# Patient Record
Sex: FEMALE | Race: BLACK OR AFRICAN AMERICAN | NOT HISPANIC OR LATINO
[De-identification: names, ages, dates, MRNs, and addresses within clinical notes are randomized per-mention and may not be internally consistent; named-entity substitution may affect disease eponyms.]

---

## 2017-05-24 ENCOUNTER — RX RENEWAL (OUTPATIENT)
Age: 42
End: 2017-05-24

## 2017-05-26 ENCOUNTER — RX RENEWAL (OUTPATIENT)
Age: 42
End: 2017-05-26

## 2017-08-15 ENCOUNTER — RX RENEWAL (OUTPATIENT)
Age: 42
End: 2017-08-15

## 2017-08-29 ENCOUNTER — LABORATORY RESULT (OUTPATIENT)
Age: 42
End: 2017-08-29

## 2017-08-29 ENCOUNTER — OUTPATIENT (OUTPATIENT)
Dept: OUTPATIENT SERVICES | Facility: HOSPITAL | Age: 42
LOS: 1 days | End: 2017-08-29
Payer: MEDICAID

## 2017-08-29 ENCOUNTER — APPOINTMENT (OUTPATIENT)
Dept: INTERNAL MEDICINE | Facility: CLINIC | Age: 42
End: 2017-08-29

## 2017-08-29 VITALS
OXYGEN SATURATION: 94 % | BODY MASS INDEX: 44.18 KG/M2 | WEIGHT: 234 LBS | HEART RATE: 70 BPM | SYSTOLIC BLOOD PRESSURE: 165 MMHG | DIASTOLIC BLOOD PRESSURE: 101 MMHG | HEIGHT: 61 IN

## 2017-08-29 DIAGNOSIS — R21 RASH AND OTHER NONSPECIFIC SKIN ERUPTION: ICD-10-CM

## 2017-08-29 DIAGNOSIS — I10 ESSENTIAL (PRIMARY) HYPERTENSION: ICD-10-CM

## 2017-08-29 DIAGNOSIS — Z86.69 PERSONAL HISTORY OF OTHER DISEASES OF THE NERVOUS SYSTEM AND SENSE ORGANS: ICD-10-CM

## 2017-08-29 DIAGNOSIS — D64.9 ANEMIA, UNSPECIFIED: ICD-10-CM

## 2017-08-29 DIAGNOSIS — E66.9 OBESITY, UNSPECIFIED: ICD-10-CM

## 2017-08-29 LAB
HBA1C BLD-MCNC: 5.2 % — SIGNIFICANT CHANGE UP (ref 4–5.6)
HCT VFR BLD CALC: 39.2 % — SIGNIFICANT CHANGE UP (ref 34.5–45)
HGB BLD-MCNC: 11 G/DL — LOW (ref 11.5–15.5)
MCHC RBC-ENTMCNC: 21.8 PG — LOW (ref 27–34)
MCHC RBC-ENTMCNC: 28.1 GM/DL — LOW (ref 32–36)
MCV RBC AUTO: 77.6 FL — LOW (ref 80–100)
PLATELET # BLD AUTO: 245 K/UL — SIGNIFICANT CHANGE UP (ref 150–400)
RBC # BLD: 5.05 M/UL — SIGNIFICANT CHANGE UP (ref 3.8–5.2)
RBC # FLD: 22 % — HIGH (ref 10.3–14.5)
WBC # BLD: 6.17 K/UL — SIGNIFICANT CHANGE UP (ref 3.8–10.5)
WBC # FLD AUTO: 6.17 K/UL — SIGNIFICANT CHANGE UP (ref 3.8–10.5)

## 2017-08-29 PROCEDURE — 84439 ASSAY OF FREE THYROXINE: CPT

## 2017-08-29 PROCEDURE — 80053 COMPREHEN METABOLIC PANEL: CPT

## 2017-08-29 PROCEDURE — 83036 HEMOGLOBIN GLYCOSYLATED A1C: CPT

## 2017-08-29 PROCEDURE — 85027 COMPLETE CBC AUTOMATED: CPT

## 2017-08-29 PROCEDURE — 83550 IRON BINDING TEST: CPT

## 2017-08-29 PROCEDURE — G0463: CPT

## 2017-08-29 PROCEDURE — 87389 HIV-1 AG W/HIV-1&-2 AB AG IA: CPT

## 2017-08-29 PROCEDURE — 84443 ASSAY THYROID STIM HORMONE: CPT

## 2017-08-29 PROCEDURE — 86803 HEPATITIS C AB TEST: CPT

## 2017-08-29 PROCEDURE — 80061 LIPID PANEL: CPT

## 2017-08-30 LAB
ALBUMIN SERPL ELPH-MCNC: 4.1 G/DL — SIGNIFICANT CHANGE UP (ref 3.3–5)
ALP SERPL-CCNC: 79 U/L — SIGNIFICANT CHANGE UP (ref 40–120)
ALT FLD-CCNC: 10 U/L — SIGNIFICANT CHANGE UP (ref 10–45)
ANION GAP SERPL CALC-SCNC: 15 MMOL/L — SIGNIFICANT CHANGE UP (ref 5–17)
AST SERPL-CCNC: 11 U/L — SIGNIFICANT CHANGE UP (ref 10–40)
BILIRUB SERPL-MCNC: <0.2 MG/DL — SIGNIFICANT CHANGE UP (ref 0.2–1.2)
BUN SERPL-MCNC: 11 MG/DL — SIGNIFICANT CHANGE UP (ref 7–23)
CALCIUM SERPL-MCNC: 10.2 MG/DL — SIGNIFICANT CHANGE UP (ref 8.4–10.5)
CHLORIDE SERPL-SCNC: 105 MMOL/L — SIGNIFICANT CHANGE UP (ref 96–108)
CHOLEST SERPL-MCNC: 155 MG/DL — SIGNIFICANT CHANGE UP (ref 10–199)
CO2 SERPL-SCNC: 24 MMOL/L — SIGNIFICANT CHANGE UP (ref 22–31)
CREAT SERPL-MCNC: 1.02 MG/DL — SIGNIFICANT CHANGE UP (ref 0.5–1.3)
GLUCOSE SERPL-MCNC: 103 MG/DL — HIGH (ref 70–99)
HCV AB S/CO SERPL IA: 0.19 S/CO — SIGNIFICANT CHANGE UP
HCV AB SERPL-IMP: SIGNIFICANT CHANGE UP
HDLC SERPL-MCNC: 66 MG/DL — SIGNIFICANT CHANGE UP (ref 40–125)
HIV 1+2 AB+HIV1 P24 AG SERPL QL IA: SIGNIFICANT CHANGE UP
IRON SATN MFR SERPL: 13 % — LOW (ref 14–50)
IRON SATN MFR SERPL: 44 UG/DL — SIGNIFICANT CHANGE UP (ref 30–160)
LIPID PNL WITH DIRECT LDL SERPL: 71 MG/DL — SIGNIFICANT CHANGE UP
POTASSIUM SERPL-MCNC: 4.2 MMOL/L — SIGNIFICANT CHANGE UP (ref 3.5–5.3)
POTASSIUM SERPL-SCNC: 4.2 MMOL/L — SIGNIFICANT CHANGE UP (ref 3.5–5.3)
PROT SERPL-MCNC: 8.1 G/DL — SIGNIFICANT CHANGE UP (ref 6–8.3)
SODIUM SERPL-SCNC: 144 MMOL/L — SIGNIFICANT CHANGE UP (ref 135–145)
T4 FREE SERPL-MCNC: 1.2 NG/DL — SIGNIFICANT CHANGE UP (ref 0.9–1.8)
TIBC SERPL-MCNC: 335 UG/DL — SIGNIFICANT CHANGE UP (ref 220–430)
TOTAL CHOLESTEROL/HDL RATIO MEASUREMENT: 2.3 RATIO — LOW (ref 3.3–7.1)
TRIGL SERPL-MCNC: 91 MG/DL — SIGNIFICANT CHANGE UP (ref 10–149)
TSH SERPL-MCNC: 1.67 UIU/ML — SIGNIFICANT CHANGE UP (ref 0.27–4.2)
UIBC SERPL-MCNC: 291 UG/DL — SIGNIFICANT CHANGE UP (ref 110–370)

## 2017-09-13 ENCOUNTER — LABORATORY RESULT (OUTPATIENT)
Age: 42
End: 2017-09-13

## 2017-09-13 ENCOUNTER — APPOINTMENT (OUTPATIENT)
Dept: OBGYN | Facility: CLINIC | Age: 42
End: 2017-09-13
Payer: MEDICAID

## 2017-09-13 ENCOUNTER — OUTPATIENT (OUTPATIENT)
Dept: OUTPATIENT SERVICES | Facility: HOSPITAL | Age: 42
LOS: 1 days | End: 2017-09-13
Payer: MEDICAID

## 2017-09-13 ENCOUNTER — RESULT REVIEW (OUTPATIENT)
Age: 42
End: 2017-09-13

## 2017-09-13 VITALS
BODY MASS INDEX: 44.18 KG/M2 | HEIGHT: 61 IN | SYSTOLIC BLOOD PRESSURE: 140 MMHG | WEIGHT: 234 LBS | DIASTOLIC BLOOD PRESSURE: 80 MMHG

## 2017-09-13 DIAGNOSIS — N97.9 FEMALE INFERTILITY, UNSPECIFIED: ICD-10-CM

## 2017-09-13 DIAGNOSIS — N76.0 ACUTE VAGINITIS: ICD-10-CM

## 2017-09-13 DIAGNOSIS — Z11.3 ENCOUNTER FOR SCREENING FOR INFECTIONS WITH A PREDOMINANTLY SEXUAL MODE OF TRANSMISSION: ICD-10-CM

## 2017-09-13 PROCEDURE — 87624 HPV HI-RISK TYP POOLED RSLT: CPT

## 2017-09-13 PROCEDURE — 88175 CYTOPATH C/V AUTO FLUID REDO: CPT

## 2017-09-13 PROCEDURE — G0463: CPT

## 2017-09-13 PROCEDURE — 99213 OFFICE O/P EST LOW 20 MIN: CPT | Mod: GE

## 2017-09-14 ENCOUNTER — OUTPATIENT (OUTPATIENT)
Dept: OUTPATIENT SERVICES | Facility: HOSPITAL | Age: 42
LOS: 1 days | End: 2017-09-14
Payer: MEDICAID

## 2017-09-14 ENCOUNTER — MESSAGE (OUTPATIENT)
Age: 42
End: 2017-09-14

## 2017-09-14 ENCOUNTER — APPOINTMENT (OUTPATIENT)
Dept: INTERNAL MEDICINE | Facility: CLINIC | Age: 42
End: 2017-09-14

## 2017-09-14 DIAGNOSIS — N76.0 ACUTE VAGINITIS: ICD-10-CM

## 2017-09-14 DIAGNOSIS — Z86.69 PERSONAL HISTORY OF OTHER DISEASES OF THE NERVOUS SYSTEM AND SENSE ORGANS: ICD-10-CM

## 2017-09-14 DIAGNOSIS — I10 ESSENTIAL (PRIMARY) HYPERTENSION: ICD-10-CM

## 2017-09-14 DIAGNOSIS — B96.89 ACUTE VAGINITIS: ICD-10-CM

## 2017-09-14 LAB
C TRACH RRNA SPEC QL NAA+PROBE: SIGNIFICANT CHANGE UP
CANDIDA AB TITR SER: SIGNIFICANT CHANGE UP
G VAGINALIS DNA SPEC QL NAA+PROBE: DETECTED
HPV HIGH+LOW RISK DNA PNL CVX: SIGNIFICANT CHANGE UP
N GONORRHOEA RRNA SPEC QL NAA+PROBE: SIGNIFICANT CHANGE UP
SPECIMEN SOURCE: SIGNIFICANT CHANGE UP
T VAGINALIS SPEC QL WET PREP: SIGNIFICANT CHANGE UP

## 2017-09-14 PROCEDURE — G0463: CPT

## 2017-09-19 ENCOUNTER — APPOINTMENT (OUTPATIENT)
Dept: DERMATOLOGY | Facility: CLINIC | Age: 42
End: 2017-09-19
Payer: MEDICAID

## 2017-09-19 VITALS
SYSTOLIC BLOOD PRESSURE: 142 MMHG | HEIGHT: 61 IN | BODY MASS INDEX: 44.18 KG/M2 | DIASTOLIC BLOOD PRESSURE: 80 MMHG | WEIGHT: 234 LBS

## 2017-09-19 DIAGNOSIS — Z86.79 PERSONAL HISTORY OF OTHER DISEASES OF THE CIRCULATORY SYSTEM: ICD-10-CM

## 2017-09-19 DIAGNOSIS — D22.9 MELANOCYTIC NEVI, UNSPECIFIED: ICD-10-CM

## 2017-09-19 DIAGNOSIS — Z91.89 OTHER SPECIFIED PERSONAL RISK FACTORS, NOT ELSEWHERE CLASSIFIED: ICD-10-CM

## 2017-09-19 DIAGNOSIS — L91.8 OTHER HYPERTROPHIC DISORDERS OF THE SKIN: ICD-10-CM

## 2017-09-19 DIAGNOSIS — L68.0 HIRSUTISM: ICD-10-CM

## 2017-09-19 PROCEDURE — 99203 OFFICE O/P NEW LOW 30 MIN: CPT

## 2017-09-22 DIAGNOSIS — Z31.69 ENCOUNTER FOR OTHER GENERAL COUNSELING AND ADVICE ON PROCREATION: ICD-10-CM

## 2017-09-22 DIAGNOSIS — L29.2 PRURITUS VULVAE: ICD-10-CM

## 2017-09-22 DIAGNOSIS — N93.9 ABNORMAL UTERINE AND VAGINAL BLEEDING, UNSPECIFIED: ICD-10-CM

## 2017-09-25 PROBLEM — L68.0 HIRSUTISM: Status: ACTIVE | Noted: 2017-09-19

## 2017-09-27 DIAGNOSIS — E66.9 OBESITY, UNSPECIFIED: ICD-10-CM

## 2017-09-27 DIAGNOSIS — Z86.69 PERSONAL HISTORY OF OTHER DISEASES OF THE NERVOUS SYSTEM AND SENSE ORGANS: ICD-10-CM

## 2017-10-17 ENCOUNTER — APPOINTMENT (OUTPATIENT)
Dept: OPHTHALMOLOGY | Facility: CLINIC | Age: 42
End: 2017-10-17
Payer: MEDICAID

## 2017-10-17 DIAGNOSIS — H40.003 PREGLAUCOMA, UNSPECIFIED, BILATERAL: ICD-10-CM

## 2017-10-17 PROCEDURE — 99204 OFFICE O/P NEW MOD 45 MIN: CPT

## 2017-10-17 PROCEDURE — 92250 FUNDUS PHOTOGRAPHY W/I&R: CPT

## 2017-10-30 ENCOUNTER — FORM ENCOUNTER (OUTPATIENT)
Age: 42
End: 2017-10-30

## 2017-10-31 ENCOUNTER — OUTPATIENT (OUTPATIENT)
Dept: OUTPATIENT SERVICES | Facility: HOSPITAL | Age: 42
LOS: 1 days | End: 2017-10-31
Payer: MEDICAID

## 2017-10-31 ENCOUNTER — APPOINTMENT (OUTPATIENT)
Dept: MAMMOGRAPHY | Facility: IMAGING CENTER | Age: 42
End: 2017-10-31
Payer: MEDICAID

## 2017-10-31 DIAGNOSIS — Z00.8 ENCOUNTER FOR OTHER GENERAL EXAMINATION: ICD-10-CM

## 2017-10-31 PROCEDURE — G0202: CPT | Mod: 26

## 2017-10-31 PROCEDURE — 77063 BREAST TOMOSYNTHESIS BI: CPT

## 2017-10-31 PROCEDURE — 77063 BREAST TOMOSYNTHESIS BI: CPT | Mod: 26

## 2017-10-31 PROCEDURE — 77067 SCR MAMMO BI INCL CAD: CPT

## 2017-11-14 ENCOUNTER — APPOINTMENT (OUTPATIENT)
Dept: OBGYN | Facility: CLINIC | Age: 42
End: 2017-11-14

## 2017-11-20 ENCOUNTER — APPOINTMENT (OUTPATIENT)
Dept: OPHTHALMOLOGY | Facility: CLINIC | Age: 42
End: 2017-11-20

## 2017-11-24 ENCOUNTER — APPOINTMENT (OUTPATIENT)
Dept: INTERNAL MEDICINE | Facility: CLINIC | Age: 42
End: 2017-11-24

## 2017-11-24 ENCOUNTER — LABORATORY RESULT (OUTPATIENT)
Age: 42
End: 2017-11-24

## 2017-11-24 ENCOUNTER — OUTPATIENT (OUTPATIENT)
Dept: OUTPATIENT SERVICES | Facility: HOSPITAL | Age: 42
LOS: 1 days | End: 2017-11-24
Payer: MEDICAID

## 2017-11-24 VITALS — WEIGHT: 234 LBS | RESPIRATION RATE: 14 BRPM | HEART RATE: 85 BPM | BODY MASS INDEX: 44.21 KG/M2

## 2017-11-24 VITALS — HEART RATE: 91 BPM | OXYGEN SATURATION: 98 % | SYSTOLIC BLOOD PRESSURE: 145 MMHG | DIASTOLIC BLOOD PRESSURE: 90 MMHG

## 2017-11-24 DIAGNOSIS — I10 ESSENTIAL (PRIMARY) HYPERTENSION: ICD-10-CM

## 2017-11-24 DIAGNOSIS — Z91.19 PATIENT'S NONCOMPLIANCE WITH OTHER MEDICAL TREATMENT AND REGIMEN: ICD-10-CM

## 2017-11-24 LAB
ANION GAP SERPL CALC-SCNC: 11 MMOL/L — SIGNIFICANT CHANGE UP (ref 5–17)
BUN SERPL-MCNC: 8 MG/DL — SIGNIFICANT CHANGE UP (ref 7–23)
CALCIUM SERPL-MCNC: 9.9 MG/DL — SIGNIFICANT CHANGE UP (ref 8.4–10.5)
CHLORIDE SERPL-SCNC: 106 MMOL/L — SIGNIFICANT CHANGE UP (ref 96–108)
CO2 SERPL-SCNC: 25 MMOL/L — SIGNIFICANT CHANGE UP (ref 22–31)
CREAT SERPL-MCNC: 0.94 MG/DL — SIGNIFICANT CHANGE UP (ref 0.5–1.3)
GLUCOSE SERPL-MCNC: 94 MG/DL — SIGNIFICANT CHANGE UP (ref 70–99)
POTASSIUM SERPL-MCNC: 3.5 MMOL/L — SIGNIFICANT CHANGE UP (ref 3.5–5.3)
POTASSIUM SERPL-SCNC: 3.5 MMOL/L — SIGNIFICANT CHANGE UP (ref 3.5–5.3)
SODIUM SERPL-SCNC: 142 MMOL/L — SIGNIFICANT CHANGE UP (ref 135–145)

## 2017-11-24 PROCEDURE — G0008: CPT

## 2017-11-24 PROCEDURE — 90688 IIV4 VACCINE SPLT 0.5 ML IM: CPT

## 2017-11-24 PROCEDURE — G0463: CPT

## 2017-11-24 PROCEDURE — 80048 BASIC METABOLIC PNL TOTAL CA: CPT

## 2017-11-24 RX ORDER — METRONIDAZOLE 7.5 MG/G
0.75 GEL VAGINAL
Qty: 5 | Refills: 0 | Status: COMPLETED | COMMUNITY
Start: 2017-09-14 | End: 2017-11-24

## 2017-12-05 DIAGNOSIS — Z23 ENCOUNTER FOR IMMUNIZATION: ICD-10-CM

## 2017-12-05 DIAGNOSIS — Z91.19 PATIENT'S NONCOMPLIANCE WITH OTHER MEDICAL TREATMENT AND REGIMEN: ICD-10-CM

## 2017-12-05 DIAGNOSIS — E66.9 OBESITY, UNSPECIFIED: ICD-10-CM

## 2017-12-14 ENCOUNTER — APPOINTMENT (OUTPATIENT)
Dept: NEUROLOGY | Facility: HOSPITAL | Age: 42
End: 2017-12-14

## 2017-12-14 ENCOUNTER — OUTPATIENT (OUTPATIENT)
Dept: OUTPATIENT SERVICES | Facility: HOSPITAL | Age: 42
LOS: 1 days | End: 2017-12-14
Payer: MEDICAID

## 2017-12-14 VITALS
WEIGHT: 240 LBS | HEIGHT: 61 IN | RESPIRATION RATE: 16 BRPM | SYSTOLIC BLOOD PRESSURE: 170 MMHG | BODY MASS INDEX: 45.31 KG/M2 | DIASTOLIC BLOOD PRESSURE: 127 MMHG | HEART RATE: 72 BPM

## 2017-12-14 DIAGNOSIS — R56.9 UNSPECIFIED CONVULSIONS: ICD-10-CM

## 2017-12-14 PROCEDURE — G0463: CPT

## 2017-12-26 ENCOUNTER — APPOINTMENT (OUTPATIENT)
Dept: INTERNAL MEDICINE | Facility: CLINIC | Age: 42
End: 2017-12-26

## 2018-01-14 ENCOUNTER — FORM ENCOUNTER (OUTPATIENT)
Age: 43
End: 2018-01-14

## 2018-01-15 ENCOUNTER — APPOINTMENT (OUTPATIENT)
Dept: MRI IMAGING | Facility: IMAGING CENTER | Age: 43
End: 2018-01-15
Payer: MEDICAID

## 2018-01-15 ENCOUNTER — OUTPATIENT (OUTPATIENT)
Dept: OUTPATIENT SERVICES | Facility: HOSPITAL | Age: 43
LOS: 1 days | End: 2018-01-15
Payer: MEDICAID

## 2018-01-15 DIAGNOSIS — R56.9 UNSPECIFIED CONVULSIONS: ICD-10-CM

## 2018-01-15 PROCEDURE — 70551 MRI BRAIN STEM W/O DYE: CPT | Mod: 26

## 2018-01-15 PROCEDURE — 70551 MRI BRAIN STEM W/O DYE: CPT

## 2018-01-18 ENCOUNTER — RESULT REVIEW (OUTPATIENT)
Age: 43
End: 2018-01-18

## 2018-01-25 ENCOUNTER — INPATIENT (INPATIENT)
Facility: HOSPITAL | Age: 43
LOS: 2 days | Discharge: HOME | DRG: 101 | End: 2018-01-28
Attending: PSYCHIATRY & NEUROLOGY | Admitting: PSYCHIATRY & NEUROLOGY
Payer: MEDICAID

## 2018-01-25 VITALS
SYSTOLIC BLOOD PRESSURE: 147 MMHG | HEART RATE: 72 BPM | TEMPERATURE: 98 F | RESPIRATION RATE: 18 BRPM | DIASTOLIC BLOOD PRESSURE: 92 MMHG | OXYGEN SATURATION: 100 %

## 2018-01-25 DIAGNOSIS — G40.909 EPILEPSY, UNSPECIFIED, NOT INTRACTABLE, WITHOUT STATUS EPILEPTICUS: ICD-10-CM

## 2018-01-25 DIAGNOSIS — R56.9 UNSPECIFIED CONVULSIONS: ICD-10-CM

## 2018-01-25 DIAGNOSIS — I10 ESSENTIAL (PRIMARY) HYPERTENSION: ICD-10-CM

## 2018-01-25 PROCEDURE — 95819 EEG AWAKE AND ASLEEP: CPT | Mod: 26

## 2018-01-25 RX ORDER — ENOXAPARIN SODIUM 100 MG/ML
40 INJECTION SUBCUTANEOUS DAILY
Qty: 0 | Refills: 0 | Status: DISCONTINUED | OUTPATIENT
Start: 2018-01-25 | End: 2018-01-28

## 2018-01-25 RX ORDER — AMLODIPINE BESYLATE 2.5 MG/1
5 TABLET ORAL DAILY
Qty: 0 | Refills: 0 | Status: DISCONTINUED | OUTPATIENT
Start: 2018-01-25 | End: 2018-01-28

## 2018-01-25 RX ORDER — LEVETIRACETAM 250 MG/1
250 TABLET, FILM COATED ORAL AT BEDTIME
Qty: 0 | Refills: 0 | Status: DISCONTINUED | OUTPATIENT
Start: 2018-01-25 | End: 2018-01-26

## 2018-01-25 RX ORDER — ACETAMINOPHEN 500 MG
650 TABLET ORAL EVERY 6 HOURS
Qty: 0 | Refills: 0 | Status: DISCONTINUED | OUTPATIENT
Start: 2018-01-25 | End: 2018-01-28

## 2018-01-25 RX ADMIN — ENOXAPARIN SODIUM 40 MILLIGRAM(S): 100 INJECTION SUBCUTANEOUS at 17:58

## 2018-01-25 RX ADMIN — Medication 650 MILLIGRAM(S): at 20:39

## 2018-01-25 RX ADMIN — LEVETIRACETAM 250 MILLIGRAM(S): 250 TABLET, FILM COATED ORAL at 22:54

## 2018-01-25 NOTE — H&P ADULT - ASSESSMENT
Pt is a 42 year old female who is coming in for elective admission for EMU monitoring to characterize/manage her seizures. MRI Brain done 1/15 showed no acute pathology.

## 2018-01-25 NOTE — H&P ADULT - NSHPPHYSICALEXAM_GEN_ALL_CORE
GENERAL PHYSICAL EXAM:  GEN: well appearing, well nourished, no distress, normal affect, cooperative   HEENT:  NCAT, OP clear  EYES: sclera white, conjunctiva clear, no nystagmus  NECK: supple  CV: RRR, no murmur     		  PULM: CTAB, no wheezing  GI: soft ABD, +BS, NT  EXT: no edema, no clubbing, no cyanosis  MSK: full ROM  SKIN: no rash on exposed skin     NEUROLOGICAL EXAM:  Mental Status  Orientation: alert and oriented to person, place, time, and situation   Language: clear and fluent, intact comprehension and repetition, intact naming and reading  Memory: remote and 3/3 delayed recall intact    Cranial Nerves  II: full visual fields intact   III, IV, VI: PERRL, EOMs full  V, VII: facial sensation and movement intact and symmetric   VIII: hearing intact   IX, X: uvla midline, soft pPalate elevates normally   XI: BL shoulder shrug intact   XII: tongue midline    Motor  5+ throughout BL                    Sensation  Intact to light touch in all 4 EXTs    Reflex  2+ in BL biceps, triceps, brachioradialis, patella, ankle

## 2018-01-25 NOTE — PATIENT PROFILE ADULT. - NS TRANSFER PATIENT BELONGINGS
Cell Phone/PDA (specify)/purse, books, , pair of bonny earrings, clothes, 1 coat/Electronic Device (specify)

## 2018-01-25 NOTE — H&P ADULT - ATTENDING COMMENTS
I personally saw and evaluated the patient at bedside with the neurology residents. I agree with the above finding and plan, except following as noted below. I discussed in detail the management and plan with the neurology team and patient. The relative risks and benefits of hospitalization, continuous video EEG monitoring with recording and storage of patient data, as well as the possibility of medication withdrawal in order to elicit events and seizures were discussed in detail with the residents and patient. Patient verbalized understanding and agreed to the plans. All of patient’s questions were addressed and answered thoroughly. Total interview duration, time allotted for patient questions, review of prior medical records/diagnostic data/studies, and time for medical documentation was greater than 70 minutes.    42yr F with multiple comorbidities and seizures that began in 1yr ago, presenting for event characterization and possible localization. Starting January 2017, she has had two types of events 1) staring episodes associated with tongue clicking, looking at her right hand 2) whole body shaking, incontinence, tongue biting. Events occur mostly in her sleep. Frequency is unknown. Risk factors are unknown. MRI Brain (2018)- wnl. Home Meds: Keppra 500mg BID.    EEG LAST 24-HR (personally reviewed entire EEG recording)  background: normal  non-epileptic paroxysmal abnormality: none  interictal: LT spikes  ictal: no event/sz    Plan:  - continuous video EEG to characterize seizures (pre-surgical workup to localize sz onset)  - taper off LEV today after 250mg this morning  - lorazepam 2mg IV prn generalized tonic-clonic seizures  - sz precaution; pt counseled about risk of GTC and asked to notify RN/PCA prior to using the bathroom  - at risk for status epilepticus

## 2018-01-25 NOTE — H&P ADULT - PROBLEM SELECTOR PLAN 1
CBC/BMP  TELE  EMU montioring  Home dose keppra 500 mg BID, will titrate down to induce event  Fall/seizure precautions  lovenox for DVT PPX CBC/BMP  TELE  EMU montioring  Home dose keppra 500 mg BID, will titrate down to 250 qhs  Fall/seizure precautions  lovenox for DVT PPX

## 2018-01-25 NOTE — H&P ADULT - HISTORY OF PRESENT ILLNESS
Neurology Consult    Patient is a 43y old  Female who presents with a chief complaint of seizure/EMU admission     HPI:  Pt is a 42 year old female who is coming in for elective admission for EMU monitoring to characterize/manage her seizures. As per patient she presented with episodes of "convulsions" which began in January and most recent episode occurred on 12/11/14. Most recently the pt was sleeping and the next thing she knew, her family was calling the ambulance. She felt lethargic/sleepy in the ambulance. She was admitted at [facility] and had a seizure workup there.     In January 2017 the first episode of convulsions occurred. She had urinary incontinence and severe tongue biting with laceration. At that time she was admitted to [facility] and a full seizure workup was reportedly completed including an MRI and was reportedly negative. Sometimes she also has staring episodes with associated clicking of her tongue and mouth and looking at her right hand, after several minutes it resolves and the pt smiles. Most of her episodes occur while she is sleeping except the staring spells with tongue clicking and rubbing her fingers together b/l.

## 2018-01-26 PROCEDURE — 99222 1ST HOSP IP/OBS MODERATE 55: CPT | Mod: GC

## 2018-01-26 PROCEDURE — 95951: CPT | Mod: 26,52

## 2018-01-26 RX ORDER — LEVETIRACETAM 250 MG/1
250 TABLET, FILM COATED ORAL ONCE
Qty: 0 | Refills: 0 | Status: COMPLETED | OUTPATIENT
Start: 2018-01-26 | End: 2018-01-26

## 2018-01-26 RX ADMIN — AMLODIPINE BESYLATE 5 MILLIGRAM(S): 2.5 TABLET ORAL at 05:12

## 2018-01-26 RX ADMIN — Medication 2.5 MILLIGRAM(S): at 05:12

## 2018-01-26 RX ADMIN — LEVETIRACETAM 250 MILLIGRAM(S): 250 TABLET, FILM COATED ORAL at 10:57

## 2018-01-26 RX ADMIN — ENOXAPARIN SODIUM 40 MILLIGRAM(S): 100 INJECTION SUBCUTANEOUS at 11:25

## 2018-01-27 ENCOUNTER — TRANSCRIPTION ENCOUNTER (OUTPATIENT)
Age: 43
End: 2018-01-27

## 2018-01-27 LAB — LEVETIRACETAM SERPL-MCNC: 18.6 MCG/ML — SIGNIFICANT CHANGE UP (ref 12–46)

## 2018-01-27 PROCEDURE — 95951: CPT | Mod: 26

## 2018-01-27 PROCEDURE — 99233 SBSQ HOSP IP/OBS HIGH 50: CPT

## 2018-01-27 RX ORDER — HYDROCHLOROTHIAZIDE 25 MG
12.5 TABLET ORAL DAILY
Qty: 0 | Refills: 0 | Status: DISCONTINUED | OUTPATIENT
Start: 2018-01-27 | End: 2018-01-28

## 2018-01-27 RX ORDER — LEVETIRACETAM 250 MG/1
250 TABLET, FILM COATED ORAL ONCE
Qty: 0 | Refills: 0 | Status: COMPLETED | OUTPATIENT
Start: 2018-01-27 | End: 2018-01-27

## 2018-01-27 RX ORDER — LEVETIRACETAM 250 MG/1
500 TABLET, FILM COATED ORAL
Qty: 0 | Refills: 0 | Status: DISCONTINUED | OUTPATIENT
Start: 2018-01-27 | End: 2018-01-28

## 2018-01-27 RX ORDER — LEVETIRACETAM 250 MG/1
250 TABLET, FILM COATED ORAL
Qty: 0 | Refills: 0 | Status: DISCONTINUED | OUTPATIENT
Start: 2018-01-27 | End: 2018-01-27

## 2018-01-27 RX ADMIN — ENOXAPARIN SODIUM 40 MILLIGRAM(S): 100 INJECTION SUBCUTANEOUS at 13:21

## 2018-01-27 RX ADMIN — LEVETIRACETAM 250 MILLIGRAM(S): 250 TABLET, FILM COATED ORAL at 16:54

## 2018-01-27 RX ADMIN — AMLODIPINE BESYLATE 5 MILLIGRAM(S): 2.5 TABLET ORAL at 05:16

## 2018-01-27 RX ADMIN — LEVETIRACETAM 250 MILLIGRAM(S): 250 TABLET, FILM COATED ORAL at 13:19

## 2018-01-27 RX ADMIN — Medication 12.5 MILLIGRAM(S): at 13:20

## 2018-01-27 RX ADMIN — Medication 2.5 MILLIGRAM(S): at 05:15

## 2018-01-27 NOTE — PROGRESS NOTE ADULT - ASSESSMENT
Pt is a 42 year old female who is coming in for elective admission for EMU monitoring to characterize/manage her seizures. MRI Brain done 1/15 showed no acute pathology.     Plan:   no AEDs  vEEG   seizure precautions Pt is a 42 year old female who is coming in for elective admission for EMU monitoring to characterize/manage her seizures. MRI Brain done 1/15 showed no acute pathology.   Events:   1/26: two focal complex seizures       Plan:   will start Keppra 250 mg bid   vEEG   seizure precautions   plan d/w Dr. Nation Pt is a 42 year old female who is coming in for elective admission for EMU monitoring to characterize/manage her seizures. MRI Brain done 1/15 subtle left temporal tip FLAIR abn, post hippo FLAIR abn?    Events:   1/26: two focal complex seizures, left temporal.    Plan:   will start Keppra 500 mg bid , titrate as outpt as needed.  discussed mood symptoms as a risk with LEV.   alt option includes ZNS or TPM for wt loss.  vEEG   seizure precautions   plan d/w Dr. Nation   d/w mother and pt x 30min

## 2018-01-27 NOTE — DISCHARGE NOTE ADULT - MEDICATION SUMMARY - MEDICATIONS TO TAKE
I will START or STAY ON the medications listed below when I get home from the hospital:    enalapril 2.5 mg oral tablet  -- 1 tab(s) by mouth once a day  -- Indication: For Hypertension, unspecified type    levETIRAcetam 750 mg oral tablet  -- 1 tab(s) by mouth 2 times a day  -- Indication: For Seizure    amLODIPine 5 mg oral tablet  -- 1 tab(s) by mouth once a day  -- Indication: For Hypertension, unspecified type    hydroCHLOROthiazide 25 mg oral tablet  -- 1 tab(s) by mouth once a day  -- Indication: For Hypertension, unspecified type    ferrous sulfate 325 mg (65 mg elemental iron) oral tablet  -- 1 tab(s) by mouth 2 times a day  -- Check with your doctor before becoming pregnant.  Do not chew, break, or crush.  May discolor urine or feces.    -- Indication: For anemia

## 2018-01-27 NOTE — DISCHARGE NOTE ADULT - PLAN OF CARE
prevent recurrence take medications as indicated   make appointment with Dr Ilan Nation in 2-3 months take medications as indicated   make appointment with Dr Ilan Nation at [address] [place] [address] in 2-3 months

## 2018-01-27 NOTE — PROGRESS NOTE ADULT - SUBJECTIVE AND OBJECTIVE BOX
NEUROLOGY FOLLOW UP NOTE     Patient is a 43y old  Female who presents with a chief complaint of seizure/EMU (25 Jan 2018 17:06)      SUBJECTIVE, INTERVAL HISTORY: No events overnight. Yesterday at 17:58 had episode of b/l repetitive hand movements, lip smacking and staring episode, lasted about 2 minutes. Noticed by mother, patient does not recall episode. Did not push button.     OBJECTIVE:   Vital Signs Last 24 Hrs  T(C): 36.3 (27 Jan 2018 08:18), Max: 36.7 (26 Jan 2018 15:02)  T(F): 97.3 (27 Jan 2018 08:18), Max: 98.1 (27 Jan 2018 00:33)  HR: 68 (27 Jan 2018 08:18) (68 - 80)  BP: 115/78 (27 Jan 2018 08:18) (115/78 - 143/87)  BP(mean): --  RR: 18 (27 Jan 2018 08:18) (18 - 18)  SpO2: 100% (27 Jan 2018 08:18) (98% - 100%)              MEDICATIONS  (STANDING):  amLODIPine   Tablet 5 milliGRAM(s) Oral daily  enalapril 2.5 milliGRAM(s) Oral daily  enoxaparin Injectable 40 milliGRAM(s) SubCutaneous daily      GENERAL EXAM: No acute distress                   NEUROLOGICAL EXAM:  Mental Status: AAOx3, fluent speech, follows simple commands, able to name  Cranial Nerves: EOMI, PERRL, VFF, V1-V3 intact, facial symmetric, tongue midline  Motor: strength 5/5 throughout. No drift x4  Sensation: Intact to LT throughout  Coordination: FTN intact b/l  Reflexes: 1+ bilateral biceps, brachioradialis, patellar and ankle    RADIOLOGY: NEUROLOGY FOLLOW UP NOTE     Patient is a 43y old  Female who presents with a chief complaint of seizure/EMU (25 Jan 2018 17:06)    SUBJECTIVE, INTERVAL HISTORY: No events overnight. Yesterday at 17:58 had episode of b/l repetitive hand movements, lip smacking and staring episode, lasted about 2 minutes. Noticed by mother, patient does not recall episode. Mom did push button.  Had sz in [place] at 840AM but just felt "dizzy" and no PB event.      MEDICATIONS  (STANDING):  amLODIPine   Tablet 5 milliGRAM(s) Oral daily  enalapril 2.5 milliGRAM(s) Oral daily  enoxaparin Injectable 40 milliGRAM(s) SubCutaneous daily    OBJECTIVE:   Vital Signs Last 24 Hrs  T(C): 36.3 (27 Jan 2018 08:18), Max: 36.7 (26 Jan 2018 15:02)  T(F): 97.3 (27 Jan 2018 08:18), Max: 98.1 (27 Jan 2018 00:33)  HR: 68 (27 Jan 2018 08:18) (68 - 80)  BP: 115/78 (27 Jan 2018 08:18) (115/78 - 143/87)  RR: 18 (27 Jan 2018 08:18) (18 - 18)  SpO2: 100% (27 Jan 2018 08:18) (98% - 100%)      GENERAL EXAM: No acute distress                   NEUROLOGICAL EXAM:  Mental Status: AAOx3, fluent speech, follows simple commands, able to name  Cranial Nerves: EOMI, PERRL, VFF, V1-V3 intact, facial symmetric, tongue midline  Motor: strength 5/5 throughout. No drift x4  Sensation: Intact to LT throughout  Coordination: FTN intact b/l  Reflexes: 1+ bilateral biceps, brachioradialis, patellar and ankle    RADIOLOGY:
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
## 2018-01-27 NOTE — DISCHARGE NOTE ADULT - CARE PROVIDERS DIRECT ADDRESSES
[email or web address]

## 2018-01-27 NOTE — DISCHARGE NOTE ADULT - MEDICATION SUMMARY - MEDICATIONS TO CHANGE
I will SWITCH the dose or number of times a day I take the medications listed below when I get home from the hospital:    Keppra 500 mg oral tablet  -- 1 tab(s) by mouth 2 times a day

## 2018-01-27 NOTE — DISCHARGE NOTE ADULT - PATIENT PORTAL LINK FT
“You can access the Follow[facility] Patient Portal, offered by [facility], by registering with the following website: [email or web address]”

## 2018-01-27 NOTE — DISCHARGE NOTE ADULT - CARE PROVIDER_API CALL
Ilan Nation (MD), Clinical Neurophysiology; EEGEpilepsy; Neurology  [address] [address]  Phone: [phone]  Fax: [phone]

## 2018-01-27 NOTE — DISCHARGE NOTE ADULT - CARE PLAN
Principal Discharge DX:	Seizure  Goal:	prevent recurrence  Assessment and plan of treatment:	take medications as indicated   make appointment with Dr Ilan Nation in 2-3 months Principal Discharge DX:	Seizure  Goal:	prevent recurrence  Assessment and plan of treatment:	take medications as indicated   make appointment with Dr Ilan Nation at [address] [place] [address] in 2-3 months

## 2018-01-28 VITALS
HEART RATE: 67 BPM | TEMPERATURE: 98 F | DIASTOLIC BLOOD PRESSURE: 75 MMHG | SYSTOLIC BLOOD PRESSURE: 108 MMHG | RESPIRATION RATE: 17 BRPM | OXYGEN SATURATION: 100 %

## 2018-01-28 PROCEDURE — 95951: CPT

## 2018-01-28 PROCEDURE — 95819 EEG AWAKE AND ASLEEP: CPT

## 2018-01-28 PROCEDURE — 99238 HOSP IP/OBS DSCHRG MGMT 30/<: CPT

## 2018-01-28 PROCEDURE — 95951: CPT | Mod: 26

## 2018-01-28 PROCEDURE — 80177 DRUG SCRN QUAN LEVETIRACETAM: CPT

## 2018-01-28 RX ORDER — LEVETIRACETAM 250 MG/1
1 TABLET, FILM COATED ORAL
Qty: 60 | Refills: 2 | OUTPATIENT
Start: 2018-01-28 | End: 2018-04-27

## 2018-01-28 RX ORDER — LEVETIRACETAM 250 MG/1
500 TABLET, FILM COATED ORAL ONCE
Qty: 0 | Refills: 0 | Status: COMPLETED | OUTPATIENT
Start: 2018-01-28 | End: 2018-01-28

## 2018-01-28 RX ORDER — LEVETIRACETAM 250 MG/1
1 TABLET, FILM COATED ORAL
Qty: 0 | Refills: 0 | COMMUNITY

## 2018-01-28 RX ADMIN — Medication 12.5 MILLIGRAM(S): at 06:22

## 2018-01-28 RX ADMIN — LEVETIRACETAM 500 MILLIGRAM(S): 250 TABLET, FILM COATED ORAL at 12:43

## 2018-01-28 RX ADMIN — LEVETIRACETAM 500 MILLIGRAM(S): 250 TABLET, FILM COATED ORAL at 09:51

## 2018-01-28 RX ADMIN — AMLODIPINE BESYLATE 5 MILLIGRAM(S): 2.5 TABLET ORAL at 06:22

## 2018-01-28 RX ADMIN — Medication 2.5 MILLIGRAM(S): at 06:22

## 2018-01-28 RX ADMIN — LEVETIRACETAM 500 MILLIGRAM(S): 250 TABLET, FILM COATED ORAL at 00:25

## 2018-02-01 ENCOUNTER — APPOINTMENT (OUTPATIENT)
Dept: NEUROLOGY | Facility: HOSPITAL | Age: 43
End: 2018-02-01

## 2018-02-21 ENCOUNTER — APPOINTMENT (OUTPATIENT)
Dept: INTERNAL MEDICINE | Facility: CLINIC | Age: 43
End: 2018-02-21

## 2018-02-21 ENCOUNTER — OUTPATIENT (OUTPATIENT)
Dept: OUTPATIENT SERVICES | Facility: HOSPITAL | Age: 43
LOS: 1 days | End: 2018-02-21
Payer: MEDICAID

## 2018-02-21 VITALS — DIASTOLIC BLOOD PRESSURE: 80 MMHG | SYSTOLIC BLOOD PRESSURE: 150 MMHG

## 2018-02-21 DIAGNOSIS — I10 ESSENTIAL (PRIMARY) HYPERTENSION: ICD-10-CM

## 2018-02-21 DIAGNOSIS — R56.9 UNSPECIFIED CONVULSIONS: ICD-10-CM

## 2018-02-21 DIAGNOSIS — E66.9 OBESITY, UNSPECIFIED: ICD-10-CM

## 2018-02-21 PROCEDURE — G0463: CPT

## 2018-03-06 ENCOUNTER — APPOINTMENT (OUTPATIENT)
Dept: INTERNAL MEDICINE | Facility: CLINIC | Age: 43
End: 2018-03-06
Payer: MEDICAID

## 2018-03-06 ENCOUNTER — OUTPATIENT (OUTPATIENT)
Dept: OUTPATIENT SERVICES | Facility: HOSPITAL | Age: 43
LOS: 1 days | End: 2018-03-06
Payer: MEDICAID

## 2018-03-06 VITALS
DIASTOLIC BLOOD PRESSURE: 80 MMHG | WEIGHT: 240.5 LBS | SYSTOLIC BLOOD PRESSURE: 130 MMHG | HEIGHT: 61 IN | BODY MASS INDEX: 45.41 KG/M2

## 2018-03-06 DIAGNOSIS — I10 ESSENTIAL (PRIMARY) HYPERTENSION: ICD-10-CM

## 2018-03-06 DIAGNOSIS — E66.9 OBESITY, UNSPECIFIED: ICD-10-CM

## 2018-03-06 PROCEDURE — 99204 OFFICE O/P NEW MOD 45 MIN: CPT | Mod: GC

## 2018-03-06 PROCEDURE — G0463: CPT

## 2018-03-12 ENCOUNTER — RX RENEWAL (OUTPATIENT)
Age: 43
End: 2018-03-12

## 2018-03-13 ENCOUNTER — RX RENEWAL (OUTPATIENT)
Age: 43
End: 2018-03-13

## 2018-04-10 ENCOUNTER — APPOINTMENT (OUTPATIENT)
Dept: INTERNAL MEDICINE | Facility: CLINIC | Age: 43
End: 2018-04-10

## 2018-04-29 ENCOUNTER — RX RENEWAL (OUTPATIENT)
Age: 43
End: 2018-04-29

## 2018-05-07 ENCOUNTER — APPOINTMENT (OUTPATIENT)
Dept: NEUROLOGY | Facility: CLINIC | Age: 43
End: 2018-05-07
Payer: MEDICAID

## 2018-05-07 ENCOUNTER — TRANSCRIPTION ENCOUNTER (OUTPATIENT)
Age: 43
End: 2018-05-07

## 2018-05-07 VITALS — HEIGHT: 61 IN | BODY MASS INDEX: 46.26 KG/M2 | WEIGHT: 245 LBS

## 2018-05-07 PROCEDURE — 99215 OFFICE O/P EST HI 40 MIN: CPT

## 2018-06-18 ENCOUNTER — RX RENEWAL (OUTPATIENT)
Age: 43
End: 2018-06-18

## 2018-06-22 ENCOUNTER — RX RENEWAL (OUTPATIENT)
Age: 43
End: 2018-06-22

## 2018-06-25 ENCOUNTER — RX RENEWAL (OUTPATIENT)
Age: 43
End: 2018-06-25

## 2018-07-23 ENCOUNTER — APPOINTMENT (OUTPATIENT)
Dept: NEUROLOGY | Facility: CLINIC | Age: 43
End: 2018-07-23

## 2018-08-20 ENCOUNTER — APPOINTMENT (OUTPATIENT)
Dept: INTERNAL MEDICINE | Facility: CLINIC | Age: 43
End: 2018-08-20
Payer: MEDICAID

## 2018-08-20 ENCOUNTER — LABORATORY RESULT (OUTPATIENT)
Age: 43
End: 2018-08-20

## 2018-08-20 VITALS
DIASTOLIC BLOOD PRESSURE: 74 MMHG | SYSTOLIC BLOOD PRESSURE: 130 MMHG | BODY MASS INDEX: 45.12 KG/M2 | HEIGHT: 61 IN | HEART RATE: 72 BPM | WEIGHT: 239 LBS | OXYGEN SATURATION: 98 %

## 2018-08-20 DIAGNOSIS — R53.83 OTHER FATIGUE: ICD-10-CM

## 2018-08-20 DIAGNOSIS — S02.5XXA FRACTURE OF TOOTH (TRAUMATIC), INITIAL ENCOUNTER FOR CLOSED FRACTURE: ICD-10-CM

## 2018-08-20 PROCEDURE — 99214 OFFICE O/P EST MOD 30 MIN: CPT | Mod: GC

## 2018-08-20 RX ORDER — TRIAMTERENE AND HYDROCHLOROTHIAZIDE 25; 37.5 MG/1; MG/1
37.5-25 TABLET ORAL
Qty: 30 | Refills: 0 | Status: DISCONTINUED | COMMUNITY
Start: 2017-08-29 | End: 2018-08-20

## 2018-08-21 NOTE — PHYSICAL EXAM
[No Acute Distress] : no acute distress [Well Nourished] : well nourished [Well Developed] : well developed [Well-Appearing] : well-appearing [PERRL] : pupils equal round and reactive to light [EOMI] : extraocular movements intact [Normal Oropharynx] : the oropharynx was normal [Normal TMs] : both tympanic membranes were normal [No JVD] : no jugular venous distention [Supple] : supple [No Lymphadenopathy] : no lymphadenopathy [Thyroid Normal, No Nodules] : the thyroid was normal and there were no nodules present [No Respiratory Distress] : no respiratory distress  [Clear to Auscultation] : lungs were clear to auscultation bilaterally [Normal Rate] : normal rate  [Regular Rhythm] : with a regular rhythm [Normal S1, S2] : normal S1 and S2 [No Murmur] : no murmur heard [No Abdominal Bruit] : a ~M bruit was not heard ~T in the abdomen [Pedal Pulses Present] : the pedal pulses are present [No Edema] : there was no peripheral edema [No Extremity Clubbing/Cyanosis] : no extremity clubbing/cyanosis [No CVA Tenderness] : no CVA  tenderness [No Spinal Tenderness] : no spinal tenderness [No Rash] : no rash [No Focal Deficits] : no focal deficits [Normal Affect] : the affect was normal [Alert and Oriented x3] : oriented to person, place, and time [de-identified] : Morbidly obese

## 2018-08-21 NOTE — HISTORY OF PRESENT ILLNESS
[FreeTextEntry1] : Complete physical exam [de-identified] : Ms. Pineda is a 43F with PMH of seizure disorder (on Keppra and Zonisamide) and HTN (on triamterene-HCTZ, enalapril and amlodipine) here for an annual physical exam. Patient reports she has increasing fatigue, feeling more tired in the middle of the day, loss of appetite since May when her seizure meds were changed, more "jittery", and numbness and tingling in hands. Reports only sleeps 4-5hrs/day with poor sleep hygiene (uses her phone prior going to bed). \par \par She continues to have these "absence-like" spells once/week especially since her recent change to her medication regimen. Last episode was at a  last week, lasts typically about a minute. She has been unable to see her neurologist Dr. Nation due to losing her insurance and it was just reinstated in August and would like to get a referral for a follow-up.\par \par In addition she has recently noted more hoarseness in her voice. Patient sings frequently particularly at [employer]. Reports she has an enlarged left tonsil and was concerned if that was associated with her hoarseness. No cough, fevers/chills, or sick contacts. Of she note, she mentions that in her apartment building in [place] there is a lot of second-hand smoke exposure. She tried to speak with the overseer but was unsuccessful.\par \par Also mentions she broke her tooth on the lower left side last year, would like a dental referral. She also wishes to have an optometry referral as she says its been 2-3 years since her glasses have been changed and shes interested in contact lenses.

## 2018-08-21 NOTE — PLAN
[FreeTextEntry1] : Ms. Pineda is a 43F with PMH of seizure disorder (on Keppra and Zonisamide) and HTN (on triamterene-HCTZ, enalapril and amlodipine) here for an annual physical exam.\par \par #Fatigue - symptoms related to hyperthyroidism but possibly can be poor sleep hygiene. No goiter appreciated on exam.\par - Thyroid panel\par - Educated on sleep hygiene\par \par #Seizure Spells - lasts about a minute, most likely needs optimization of her anti-seizure regimen\par - Referral to neuro\par \par #Broken Tooth\par - Dental referral\par \par #Vision changes\par - Optometry referral\par \par #Health Maintenance\par - CMP, CBC, lipid panel, HbA1C\par - Mammogram\par \par RTC in 5 weeks\par \par Kelly Cervantes MD, PGY1\par Department of Internal Medicine\par

## 2018-08-21 NOTE — REVIEW OF SYSTEMS
[Recent Change In Weight] : ~T recent weight change [Vision Problems] : vision problems [Hoarseness] : hoarseness [Insomnia] : insomnia [Fever] : no fever [Chills] : no chills [Earache] : no earache [Hearing Loss] : no hearing loss [Nasal Discharge] : no nasal discharge [Sore Throat] : no sore throat [Chest Pain] : no chest pain [Palpitations] : no palpitations [Lower Ext Edema] : no lower extremity edema [Shortness Of Breath] : no shortness of breath [Wheezing] : no wheezing [Cough] : no cough [Abdominal Pain] : no abdominal pain [Nausea] : no nausea [Constipation] : no constipation [Vomiting] : no vomiting [Heartburn] : no heartburn [Dysuria] : no dysuria [Hematuria] : no hematuria [Joint Pain] : no joint pain [Muscle Pain] : no muscle pain [Skin Rash] : no skin rash [Headache] : no headache [Easy Bruising] : no easy bruising

## 2018-08-24 LAB
ALBUMIN SERPL ELPH-MCNC: 4.2 G/DL
ALP BLD-CCNC: 77 U/L
ALT SERPL-CCNC: 12 U/L
ANION GAP SERPL CALC-SCNC: 15 MMOL/L
AST SERPL-CCNC: 12 U/L
BASOPHILS # BLD AUTO: 0.02 K/UL
BASOPHILS NFR BLD AUTO: 0.4 %
BILIRUB SERPL-MCNC: 0.2 MG/DL
BUN SERPL-MCNC: 14 MG/DL
CALCIUM SERPL-MCNC: 10.7 MG/DL
CHLORIDE SERPL-SCNC: 100 MMOL/L
CHOLEST SERPL-MCNC: 165 MG/DL
CHOLEST/HDLC SERPL: 2.5 RATIO
CO2 SERPL-SCNC: 29 MMOL/L
CREAT SERPL-MCNC: 1.15 MG/DL
EOSINOPHIL # BLD AUTO: 0.03 K/UL
EOSINOPHIL NFR BLD AUTO: 0.6 %
GLUCOSE SERPL-MCNC: 97 MG/DL
HBA1C MFR BLD HPLC: 6 %
HCT VFR BLD CALC: 40.3 %
HDLC SERPL-MCNC: 65 MG/DL
HGB BLD-MCNC: 12.7 G/DL
IMM GRANULOCYTES NFR BLD AUTO: 0.2 %
LDLC SERPL CALC-MCNC: 73 MG/DL
LYMPHOCYTES # BLD AUTO: 1.52 K/UL
LYMPHOCYTES NFR BLD AUTO: 27.9 %
MAN DIFF?: NORMAL
MCHC RBC-ENTMCNC: 28 PG
MCHC RBC-ENTMCNC: 31.5 GM/DL
MCV RBC AUTO: 89 FL
MONOCYTES # BLD AUTO: 0.7 K/UL
MONOCYTES NFR BLD AUTO: 12.8 %
NEUTROPHILS # BLD AUTO: 3.17 K/UL
NEUTROPHILS NFR BLD AUTO: 58.1 %
PLATELET # BLD AUTO: 276 K/UL
POTASSIUM SERPL-SCNC: 3.1 MMOL/L
PROT SERPL-MCNC: 7.7 G/DL
RBC # BLD: 4.53 M/UL
RBC # FLD: 14.6 %
SODIUM SERPL-SCNC: 144 MMOL/L
TRIGL SERPL-MCNC: 133 MG/DL
TSH SERPL-ACNC: 1.12 UIU/ML
WBC # FLD AUTO: 5.45 K/UL

## 2018-09-17 ENCOUNTER — APPOINTMENT (OUTPATIENT)
Dept: INTERNAL MEDICINE | Facility: CLINIC | Age: 43
End: 2018-09-17

## 2018-10-23 ENCOUNTER — APPOINTMENT (OUTPATIENT)
Dept: INTERNAL MEDICINE | Facility: CLINIC | Age: 43
End: 2018-10-23

## 2018-11-07 ENCOUNTER — RX RENEWAL (OUTPATIENT)
Age: 43
End: 2018-11-07

## 2019-01-14 ENCOUNTER — RX RENEWAL (OUTPATIENT)
Age: 44
End: 2019-01-14

## 2019-02-20 ENCOUNTER — RX RENEWAL (OUTPATIENT)
Age: 44
End: 2019-02-20

## 2019-04-16 ENCOUNTER — APPOINTMENT (OUTPATIENT)
Dept: OBGYN | Facility: CLINIC | Age: 44
End: 2019-04-16

## 2019-04-17 ENCOUNTER — APPOINTMENT (OUTPATIENT)
Dept: INTERNAL MEDICINE | Facility: CLINIC | Age: 44
End: 2019-04-17

## 2019-05-15 ENCOUNTER — OUTPATIENT (OUTPATIENT)
Dept: OUTPATIENT SERVICES | Facility: HOSPITAL | Age: 44
LOS: 1 days | End: 2019-05-15
Payer: MEDICAID

## 2019-05-15 ENCOUNTER — APPOINTMENT (OUTPATIENT)
Dept: INTERNAL MEDICINE | Facility: CLINIC | Age: 44
End: 2019-05-15
Payer: MEDICAID

## 2019-05-15 VITALS
OXYGEN SATURATION: 98 % | HEART RATE: 65 BPM | DIASTOLIC BLOOD PRESSURE: 80 MMHG | HEIGHT: 61 IN | BODY MASS INDEX: 46.26 KG/M2 | WEIGHT: 245 LBS | SYSTOLIC BLOOD PRESSURE: 132 MMHG

## 2019-05-15 DIAGNOSIS — I10 ESSENTIAL (PRIMARY) HYPERTENSION: ICD-10-CM

## 2019-05-15 PROCEDURE — 99213 OFFICE O/P EST LOW 20 MIN: CPT | Mod: GE

## 2019-05-15 PROCEDURE — G0463: CPT

## 2019-05-19 NOTE — HISTORY OF PRESENT ILLNESS
[FreeTextEntry8] : 44 F with PMH of seizure disorder (on Keppra and Zonisamide) and HTN (on triamterene-HCTZ, enalapril and amlodipine) here for medication renewal and referrals, however patient arrived for appointment after scheduled time was over (45 minutes late for a half hour visit). Patient was seen for medication renewal and referrals, however asked to schedule another visit for all other concerns. \par \par Patient says she has been doing well, except for these episodes of "mind fogginess" where she's dazed for less than a minute. She doesn't notice it but says her coworkers notice that she's not completely herself for a few seconds. She has no loss of consciousness and her seizures are usually convulsive. These episodes have been happening for the past year, usually happens once a month. Patient was seen by neurology about a year ago and was supposed to start zonisamide and taper herself off of the levetiracetam, however she has not done that because she felt like she was getting headaches. Currently denies any headaches, numbness or tingling of extremities. Patient denies any fever, chills, abdominal pain, GI or  concerns. She needs to follow up with the ophthalmologist and dentistry.

## 2019-05-19 NOTE — PHYSICAL EXAM
[No Acute Distress] : no acute distress [Well Nourished] : well nourished [Well Developed] : well developed [Normal Sclera/Conjunctiva] : normal sclera/conjunctiva [PERRL] : pupils equal round and reactive to light [EOMI] : extraocular movements intact [Normal Outer Ear/Nose] : the outer ears and nose were normal in appearance [Normal Oropharynx] : the oropharynx was normal [Normal TMs] : both tympanic membranes were normal [No Respiratory Distress] : no respiratory distress  [Clear to Auscultation] : lungs were clear to auscultation bilaterally [No Accessory Muscle Use] : no accessory muscle use [Normal Rate] : normal rate  [Regular Rhythm] : with a regular rhythm [Normal S1, S2] : normal S1 and S2 [No Edema] : there was no peripheral edema [Soft] : abdomen soft [Non Tender] : non-tender [Normal Gait] : normal gait [Coordination Grossly Intact] : coordination grossly intact [No Focal Deficits] : no focal deficits [de-identified] : normal strength in all extremities, no tremors

## 2019-05-19 NOTE — REVIEW OF SYSTEMS
[Memory Loss] : memory loss [Negative] : Respiratory [Dizziness] : no dizziness [Headache] : no headache [Fainting] : no fainting [Confusion] : no confusion [Unsteady Walking] : no ataxia

## 2019-05-21 DIAGNOSIS — R56.9 UNSPECIFIED CONVULSIONS: ICD-10-CM

## 2019-06-05 ENCOUNTER — APPOINTMENT (OUTPATIENT)
Dept: NEUROLOGY | Facility: CLINIC | Age: 44
End: 2019-06-05

## 2019-07-01 ENCOUNTER — MEDICATION RENEWAL (OUTPATIENT)
Age: 44
End: 2019-07-01

## 2019-07-01 ENCOUNTER — MOBILE ON CALL (OUTPATIENT)
Age: 44
End: 2019-07-01

## 2019-07-03 ENCOUNTER — TRANSCRIPTION ENCOUNTER (OUTPATIENT)
Age: 44
End: 2019-07-03

## 2019-07-12 ENCOUNTER — APPOINTMENT (OUTPATIENT)
Dept: NEUROLOGY | Facility: CLINIC | Age: 44
End: 2019-07-12
Payer: MEDICAID

## 2019-07-12 VITALS
SYSTOLIC BLOOD PRESSURE: 150 MMHG | WEIGHT: 246 LBS | HEART RATE: 78 BPM | BODY MASS INDEX: 46.44 KG/M2 | DIASTOLIC BLOOD PRESSURE: 90 MMHG | HEIGHT: 61 IN

## 2019-07-12 PROCEDURE — 99214 OFFICE O/P EST MOD 30 MIN: CPT

## 2019-07-15 NOTE — DATA REVIEWED
[de-identified] : 1/28/18 Addendum: MRI with subtle left temporal tip and posterior hippocampal FLAIR changes [de-identified] : VEEG (1/2018) 3 day: shows left frontotemporal waveforms with spikes and waves in sleep. 3 left frontotemporal onset seizures with right sided spread.

## 2019-07-15 NOTE — DISCUSSION/SUMMARY
[Medically Refractory (seizure within the last year)] : Medically Refractory (seizure within the last year) [Complex Partial] : complex partial [Secondary Generalization] : secondary generalization [Focal] : focal [Symptomatic] : symptomatic [Risks Associated with Driving/[place] Law] : As per my usual protocol, the patient was advised in regards to risks and driving privileges associated with the [place] Guidelines.  [Safety Recommendations] : The patient was advised in regards to the risk of seizures and general seizure safety recommendations including not to be bathing alone, climbing to high places and operating heavy machinery. [Compliance with Medications] : The importance of compliance with medications was reinforced. [Medication Side Effects] : High frequency and serious potential medication adverse effects were reviewed with the patient, including but not exclusive to psychiatric effects.  Information sheets on medication side effects were made available to the patient in our clinic.  The patient or advocate agrees to notify us for any concerns. [Teratogenicity] : Risks associated with AED use in pregnancy, teratogenicity and methods of contraception were discussed.  [Neuropsychological Testing] : Neuropsychological testing  was requested for evaluation of cognitive complaints, and possible implications in regards to presurgical epilepsy planning. [Surgical Options/Risks/Benefits] : Surgical options/risks/benefits for intractable epilepsy were discussed. [Risk of Death] : Risk of death associated with seizures / SUDEP was discussed. 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  First episode was in January of 2017, recent onset.  GTCs but also staring spells with tongue clicking and b/l finger rubbing automatisms. \par \par Plan:\par Continue Keppra 750mg BID\par - restart low dose of Zonegran 100mg daily and increase to 200mg daily in one week and then 300mg\par   *will titrate slow with the plan to taper off Keppra as patient is morbidly obese\par - annual labwork\par -PET brain\par - Neuropsych eval\par - follow up in 3 months\par \par \par \par No driving until seizure free for 1 year, safety\par \par HTN, wt issues, f/u with PMD, exercise\par visual complaints, blurring, no papilledema on exam. f.u optho\par RTC in 3 mo

## 2019-07-15 NOTE — HISTORY OF PRESENT ILLNESS
[FreeTextEntry1] : Concern for prior missed appts, 40min late today.  insurance issues.  \par Pre review of records:\par \par ***UPDATE:7/12/19***\par 30 minutes late for appointment today*\par Meds:  LEV 750mg bid, inc in [facility] on d/c.\par she stopped Zonisamide in January due to headaches?\par \par Events as of late:  No ADRs.  Still having events, milder  Feels eyes feel funny sometimes, blurriness intermittently.  Infrequent HA top of head.  No AM HA.  One staring episode in last 3mo with feeling overwhelmed. \par \par HPI: 44 yo F who presents for seizures. "convulsions" which began in January 2017. The first episode of convulsions occurred. She had urinary incontinence and severe tongue biting with laceration.  At that time she was admitted to [facility] and a full seizure workup was reportedly completed including an MRI and was reportedly "negative." Sometimes she also has staring episodes with associated clicking of her tongue and mouth and looking at her right hand, after several minutes it resolves and the pt smiles. Most of her episodes occur while she is sleeping except the staring spells with tongue clicking and rubbing her fingers together b/l. \par \par At [facility] neuro clinic: 1/28/18 MRI with subtle left temporal tip and posterior hippocampal FLAIR changes\par \par NKDA\par ROS - numbness in toes and sometimes has difficulty finding words quickly over the past few months, also depressed mood. Denies problems talking, walking, weakness, word finding difficulties, dizziness, headache, lightheadness\par Social - denies toxic habits, nonsmoker\par

## 2019-08-08 ENCOUNTER — RX RENEWAL (OUTPATIENT)
Age: 44
End: 2019-08-08

## 2019-08-13 ENCOUNTER — FORM ENCOUNTER (OUTPATIENT)
Age: 44
End: 2019-08-13

## 2019-08-14 ENCOUNTER — OUTPATIENT (OUTPATIENT)
Dept: OUTPATIENT SERVICES | Facility: HOSPITAL | Age: 44
LOS: 1 days | End: 2019-08-14
Payer: MEDICAID

## 2019-08-14 ENCOUNTER — APPOINTMENT (OUTPATIENT)
Dept: NUCLEAR MEDICINE | Facility: IMAGING CENTER | Age: 44
End: 2019-08-14

## 2019-08-14 DIAGNOSIS — Z00.00 ENCOUNTER FOR GENERAL ADULT MEDICAL EXAMINATION WITHOUT ABNORMAL FINDINGS: ICD-10-CM

## 2019-08-14 PROCEDURE — A9552: CPT

## 2019-08-14 PROCEDURE — 78608 BRAIN IMAGING (PET): CPT | Mod: 26

## 2019-08-14 PROCEDURE — 78608 BRAIN IMAGING (PET): CPT

## 2019-08-26 ENCOUNTER — RX RENEWAL (OUTPATIENT)
Age: 44
End: 2019-08-26

## 2019-09-11 ENCOUNTER — RX RENEWAL (OUTPATIENT)
Age: 44
End: 2019-09-11

## 2019-10-03 ENCOUNTER — OUTPATIENT (OUTPATIENT)
Dept: OUTPATIENT SERVICES | Facility: HOSPITAL | Age: 44
LOS: 1 days | End: 2019-10-03
Payer: MEDICAID

## 2019-10-03 ENCOUNTER — APPOINTMENT (OUTPATIENT)
Dept: INTERNAL MEDICINE | Facility: CLINIC | Age: 44
End: 2019-10-03
Payer: MEDICAID

## 2019-10-03 VITALS
HEART RATE: 72 BPM | HEIGHT: 61 IN | SYSTOLIC BLOOD PRESSURE: 114 MMHG | BODY MASS INDEX: 45.69 KG/M2 | OXYGEN SATURATION: 98 % | WEIGHT: 242 LBS | DIASTOLIC BLOOD PRESSURE: 80 MMHG

## 2019-10-03 DIAGNOSIS — I10 ESSENTIAL (PRIMARY) HYPERTENSION: ICD-10-CM

## 2019-10-03 PROCEDURE — G0463: CPT

## 2019-10-03 PROCEDURE — 99214 OFFICE O/P EST MOD 30 MIN: CPT | Mod: GC

## 2019-10-04 NOTE — PHYSICAL EXAM
[No Lymphadenopathy] : no lymphadenopathy [Supple] : supple [Pedal Pulses Present] : the pedal pulses are present [No Edema] : there was no peripheral edema [No Extremity Clubbing/Cyanosis] : no extremity clubbing/cyanosis [Normal] : affect was normal and insight and judgment were intact [de-identified] : Very mild erythematous, dry skin on scalp by hairline; dark discoloration ~4-5 cm on right wrist; ~1 cm dark mole in left palm (chronic); greenish yellow discoloration under fingernails (top of nail covered by fake nails) [de-identified] : obese

## 2019-10-04 NOTE — ASSESSMENT
[FreeTextEntry1] : 43 y/o F with PMH of seizure disorder (on Keppra and Zonegran), HTN (on triamterene-HCTZ, enalipril, amlodipine), and morbid obesity (BMI 46 in July 2019) presenting with concerns of recurrent R wrist rash and hair loss/itchiness of scalp starting in August as well as chronic, intermittent green discoloration of her fingernails.\par \par #Right wrist rash, recurrent\par -possibly 2/2 starting Zonegran vs eczema\par -f/u Neurology this month\par -given Dermatology referral\par -advised to try OTC products such as Aveeno moisturizing lotion, Eucerin, and Aquaphor\par \par #Green discoloration of fingernails, chronic intermittent\par -of unclear etiology\par -given Dermatology referral\par \par #Hair loss/scalp itchiness and soreness\par -possibly 2/2 starting Zonegran vs seborrheic dermatitis vs tinea capitis\par -f/u Neurology this month\par -given Dermatology referral\par -advised to try OTC products such as Selsun Blue shampoo\par \par #Mole in left palm\par -continue to monitor (reports it is hereditary as her father had it too)\par -given Dermatology referral for skin cancer screening (saw Derm for this in 2017)\par \par #Chronic cold intolerance/constipation\par -pt in agreement to address at annual CPE appointment in ~2 weeks\par \par #Seizure disorder\par -last seizure (GTC) one month ago\par -c/w regimen of Keppra and Zonegran for now\par -f/u Neurology this month and discuss alternative med options\par \par #HTN\par -well-controlled\par -c/w regimen of triamterene-HCTZ, enalipril, and amlodipine\par \par #HCM\par -last breast cancer screening (mammogram) in 10/31/2017, f/u Ob-Gyn\par -routine blood work to be completed at CPE visit in ~2 weeks\par \par RTC in ~2 weeks at scheduled appointment for annual CPE\par d/w Dr. Stoddard

## 2019-10-04 NOTE — REVIEW OF SYSTEMS
[Constipation] : constipation [Itching] : Itching [Nail Changes] : nail changes [Hair Changes] : hair changes [Skin Rash] : skin rash [Memory Loss] : memory loss [Negative] : Psychiatric [FreeTextEntry7] : had issues of constipation in the past due to iron pills which pt has since stopped [de-identified] : skin rash on wrist currently resolved but is recurrent. Hair has thinned and has fallen out but not patchy loss. Scalp has itchiness and soreness

## 2019-10-04 NOTE — HISTORY OF PRESENT ILLNESS
[FreeTextEntry8] : 43 y/o F with PMH of seizure disorder (on Keppra and Zonegran), HTN (on triamterene-HCTZ, enalipril, amlodipine), and morbid obesity (BMI 46 in July 2019) presenting with concerns of recurrent R wrist rash and hair loss/itchiness of scalp starting in August of this year. Of note, patient saw Neurology in July 2019 and was started on Zonegran (due to patient's desire to lose weight, it seems). Pt also noted a "green fungus" on her fingernails that started 3 years ago when her seizures first started. Aside from the symptoms mentioned below, pt denied f/c/n/v, CP, SOB, changes in vision, abdominal pain, dysuria, and diarrhea. Pt also reported concerns of chronic cold intolerance (though past thyroid function tests have been normal) and constipation (pt attributes to taking iron supplements, which she has self-discontinued). Pt notes that she has an appointment coming up in ~2 weeks for her annual CPE.\par \par #R wrist rash: pt reports R wrist rash has happened twice since August. It only occurs when she gets flustered or excited during a seizure episode and not necessarily every time that she has a seizure. She has never had it before August of this year. She notes that when it happens, it is itchy deep inside. She has not tried anything to make it better as it has gone away by itself. While there is no active rash right now, she notes a dark spot on the R wrist where it would be very red and swollen when it happens. She has not noticed any other rashes on her body. She went to an urgent care center the last time it happened thinking it may be a mosquito bite but was was told it was not and that she should see her PMD. Patient wears jewelry (bracelets) on her R wrist, but reports wearing the bracelets for a long time now and has never had skin issues while wearing them. Pt denies hx of eczema or asthma and reports no hx of either in her family.\par \par #Hair loss/itchiness of scalp: pt reports she has noticed thinning of her hair and general hair loss on her scalp since August. She notes that it is associated with itchiness and soreness all over her scalp. She notes there is redness of her scalp as well. She denies patchy hair loss and denies dandruff.\par \par #"Green fungus" on nails: pt states she has had it on and off since the seizures started 3 years ago. She notes embarrassment since having it. She would notice it at the bases of her nails and let it grow out before trimming her nails to get rid of it, but has come back before. Pt denies touching anything green in particular that might have caused this.\par \par #Chronic cold intolerance & constipation: pt agreed to have these issues addressed at annual CPE in ~2 weeks

## 2019-10-15 ENCOUNTER — OUTPATIENT (OUTPATIENT)
Dept: OUTPATIENT SERVICES | Facility: HOSPITAL | Age: 44
LOS: 1 days | End: 2019-10-15
Payer: MEDICAID

## 2019-10-15 ENCOUNTER — APPOINTMENT (OUTPATIENT)
Dept: INTERNAL MEDICINE | Facility: CLINIC | Age: 44
End: 2019-10-15
Payer: MEDICAID

## 2019-10-15 VITALS
BODY MASS INDEX: 43.99 KG/M2 | WEIGHT: 233 LBS | OXYGEN SATURATION: 93 % | HEART RATE: 77 BPM | HEIGHT: 61 IN | SYSTOLIC BLOOD PRESSURE: 124 MMHG | DIASTOLIC BLOOD PRESSURE: 80 MMHG

## 2019-10-15 DIAGNOSIS — Z23 ENCOUNTER FOR IMMUNIZATION: ICD-10-CM

## 2019-10-15 DIAGNOSIS — I10 ESSENTIAL (PRIMARY) HYPERTENSION: ICD-10-CM

## 2019-10-15 DIAGNOSIS — R56.9 UNSPECIFIED CONVULSIONS: ICD-10-CM

## 2019-10-15 PROCEDURE — 90688 IIV4 VACCINE SPLT 0.5 ML IM: CPT

## 2019-10-15 PROCEDURE — G0463: CPT | Mod: 25

## 2019-10-15 PROCEDURE — 99213 OFFICE O/P EST LOW 20 MIN: CPT | Mod: GE

## 2019-10-15 PROCEDURE — G0008: CPT

## 2019-10-16 DIAGNOSIS — R56.9 UNSPECIFIED CONVULSIONS: ICD-10-CM

## 2019-10-16 DIAGNOSIS — L65.9 NONSCARRING HAIR LOSS, UNSPECIFIED: ICD-10-CM

## 2019-10-16 DIAGNOSIS — R21 RASH AND OTHER NONSPECIFIC SKIN ERUPTION: ICD-10-CM

## 2019-10-18 NOTE — PHYSICAL EXAM
[No Acute Distress] : no acute distress [Well Nourished] : well nourished [Well-Appearing] : well-appearing [Well Developed] : well developed [PERRL] : pupils equal round and reactive to light [Normal Sclera/Conjunctiva] : normal sclera/conjunctiva [EOMI] : extraocular movements intact [Normal Outer Ear/Nose] : the outer ears and nose were normal in appearance [Normal Oropharynx] : the oropharynx was normal [No JVD] : no jugular venous distention [No Lymphadenopathy] : no lymphadenopathy [Supple] : supple [Thyroid Normal, No Nodules] : the thyroid was normal and there were no nodules present [No Respiratory Distress] : no respiratory distress  [No Accessory Muscle Use] : no accessory muscle use [Clear to Auscultation] : lungs were clear to auscultation bilaterally [Normal Rate] : normal rate  [Regular Rhythm] : with a regular rhythm [Normal S1, S2] : normal S1 and S2 [No Murmur] : no murmur heard [No Carotid Bruits] : no carotid bruits [No Abdominal Bruit] : a ~M bruit was not heard ~T in the abdomen [No Varicosities] : no varicosities [No Edema] : there was no peripheral edema [Pedal Pulses Present] : the pedal pulses are present [No Palpable Aorta] : no palpable aorta [Non Tender] : non-tender [No Extremity Clubbing/Cyanosis] : no extremity clubbing/cyanosis [Soft] : abdomen soft [Non-distended] : non-distended [No Masses] : no abdominal mass palpated [Normal Bowel Sounds] : normal bowel sounds [No HSM] : no HSM [Normal Posterior Cervical Nodes] : no posterior cervical lymphadenopathy [Normal Anterior Cervical Nodes] : no anterior cervical lymphadenopathy [No CVA Tenderness] : no CVA  tenderness [No Spinal Tenderness] : no spinal tenderness [No Joint Swelling] : no joint swelling [Grossly Normal Strength/Tone] : grossly normal strength/tone [No Rash] : no rash [Coordination Grossly Intact] : coordination grossly intact [No Focal Deficits] : no focal deficits [Normal Gait] : normal gait [Deep Tendon Reflexes (DTR)] : deep tendon reflexes were 2+ and symmetric [Normal Affect] : the affect was normal [Normal Insight/Judgement] : insight and judgment were intact [de-identified] : Obese

## 2019-10-18 NOTE — ASSESSMENT
[FreeTextEntry1] : 45 y/o F with PMH of seizure disorder (on Keppra and Zonegran), HTN (on triamterene-HCTZ, enalapril, amlodipine), and morbid obesity (BMI 46 in July 2019) \par \par #Seizure disorder\par - C/w Keppra and Zonegran. Neuro follow up for Keppra taper\par - Seizure frequency reduced to once per month. Well controlled; pt understands auras/triggering events and has been avoiding. Discussed stress/anxiety relief. \par - PT should not be driving per Neuro. Will discuss and reiterate this information with patient. \par \par #HTN\par - c/w Triam HCTZ,  Amlodipine and Enalapril\par \par #Morbid obesity\par - Dietary and lifestyle modification\par - Bariatric surgery referral deferred for now \par \par #HCM\par - PHQ2- 0\par - Mammo referral. HPV negative 2017\par - Flu shot today\par \par Case d/w Dr. Torres

## 2019-10-18 NOTE — HISTORY OF PRESENT ILLNESS
[FreeTextEntry8] : 43 y/o F with PMH of seizure disorder (on Keppra and Zonegran), HTN (on triamterene-HCTZ, enalapril, amlodipine), and morbid obesity (BMI 46 in July 2019) \par \par Seizure disorder- Continues to take Keppra 750mg PO BID without and difficulty. Recently started on Zonegran without any adverse effects. 1 seizure per month. Driving without difficulty. Usually can tell when she is about to have a seizure as her aura usually manifests as a 'funny feeling in her head and stomach'. Neuro following her last seen July 2019\par \par HTN- Takes Enalapril, triam HCTZ and Amlodipine with no adverse effects. She checks her BP's once every 3 days and usually ranges between 130-140s/70-80\par \par Wrist rash- resolved. Dermatology appt pending. Component of contact dermatitis- pt has been watching which wrist jewelry she wears. Improved without any abx or tx\par \par

## 2019-12-20 ENCOUNTER — APPOINTMENT (OUTPATIENT)
Dept: NEUROLOGY | Facility: CLINIC | Age: 44
End: 2019-12-20
Payer: MEDICAID

## 2019-12-20 PROCEDURE — 96139 PSYCL/NRPSYC TST TECH EA: CPT | Mod: NC

## 2019-12-20 PROCEDURE — 96133 NRPSYC TST EVAL PHYS/QHP EA: CPT

## 2019-12-20 PROCEDURE — 96138 PSYCL/NRPSYC TECH 1ST: CPT | Mod: NC

## 2019-12-20 PROCEDURE — 96132 NRPSYC TST EVAL PHYS/QHP 1ST: CPT

## 2019-12-20 PROCEDURE — 96116 NUBHVL XM PHYS/QHP 1ST HR: CPT

## 2020-01-03 ENCOUNTER — APPOINTMENT (OUTPATIENT)
Dept: NEUROLOGY | Facility: CLINIC | Age: 45
End: 2020-01-03

## 2020-01-24 ENCOUNTER — APPOINTMENT (OUTPATIENT)
Dept: NEUROLOGY | Facility: CLINIC | Age: 45
End: 2020-01-24
Payer: MEDICAID

## 2020-01-24 ENCOUNTER — NON-APPOINTMENT (OUTPATIENT)
Age: 45
End: 2020-01-24

## 2020-01-24 VITALS
WEIGHT: 235 LBS | HEIGHT: 61 IN | HEART RATE: 79 BPM | DIASTOLIC BLOOD PRESSURE: 85 MMHG | SYSTOLIC BLOOD PRESSURE: 131 MMHG | BODY MASS INDEX: 44.37 KG/M2

## 2020-01-24 PROCEDURE — 99214 OFFICE O/P EST MOD 30 MIN: CPT

## 2020-01-27 NOTE — DATA REVIEWED
[de-identified] : 1/28/18 Addendum: MRI with subtle left temporal tip and posterior hippocampal FLAIR changes [de-identified] : VEEG (1/2018) 3 day: shows left frontotemporal waveforms with spikes and waves in sleep. 3 left frontotemporal onset seizures with right sided spread.

## 2020-01-27 NOTE — DISCUSSION/SUMMARY
[Medically Refractory (seizure within the last year)] : Medically Refractory (seizure within the last year) [Complex Partial] : complex partial [Secondary Generalization] : secondary generalization [Symptomatic] : symptomatic [Focal] : focal [Risks Associated with Driving/[place] Law] : As per my usual protocol, the patient was advised in regards to risks and driving privileges associated with the [place] Guidelines.  [Safety Recommendations] : The patient was advised in regards to the risk of seizures and general seizure safety recommendations including not to be bathing alone, climbing to high places and operating heavy machinery. [Medication Side Effects] : High frequency and serious potential medication adverse effects were reviewed with the patient, including but not exclusive to psychiatric effects.  Information sheets on medication side effects were made available to the patient in our clinic.  The patient or advocate agrees to notify us for any concerns. [Compliance with Medications] : The importance of compliance with medications was reinforced. [Teratogenicity] : Risks associated with AED use in pregnancy, teratogenicity and methods of contraception were discussed.  [Surgical Options/Risks/Benefits] : Surgical options/risks/benefits for intractable epilepsy were discussed. [Risk of Death] : Risk of death associated with seizures / SUDEP was discussed. [Neuropsychological Testing] : Neuropsychological testing  was requested for evaluation of cognitive complaints, and possible implications in regards to presurgical epilepsy planning. 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  First episode was in January of 2017, recent onset.  GTCs but also staring spells with tongue clicking and b/l finger rubbing automatisms. \par \par Plan:\par Continue Keppra 750mg BID, Zonisamide 400mg daily\par - reviewed seizure triggers\par - annual labwork\par - Neuropsych eval for c/o STM\par - Exercise RX- mindfulness ordered and sent to patient\par - start multivitamin containing Zinc, Selenium, Vit E or Biotin for hair loss - f/u with Dermatology\par - follow up in 3 months\par \par \par \par No driving until seizure free for 1 year, safety\par \par HTN, wt issues, f/u with PMD, exercise\par visual complaints, blurring, no papilledema on exam. f.u optho\par RTC in 3 mo

## 2020-01-27 NOTE — PHYSICAL EXAM
[Short Term Intact] : short term memory intact [Person] : oriented to person [Oriented To Time, Place, And Person] : oriented to person, place, and time [Span Intact] : the attention span was normal [Naming Objects] : no difficulty naming common objects [Cranial Nerves Oculomotor (III)] : extraocular motion intact [Cranial Nerves Trigeminal (V)] : facial sensation intact symmetrically [Cranial Nerves Optic (II)] : visual acuity intact bilaterally,  visual fields full to confrontation, pupils equal round and reactive to light [Motor Handedness Right-Handed] : the patient is right hand dominant [Cranial Nerves Facial (VII)] : face symmetrical [Sensation Tactile Decrease] : light touch was intact [Balance] : balance was intact [2+] : Patella right 2+ [Sclera] : the sclera and conjunctiva were normal [PERRL With Normal Accommodation] : pupils were equal in size, round, reactive to light, with normal accommodation [Extraocular Movements] : extraocular movements were intact [Outer Ear] : the ears and nose were normal in appearance [] : no respiratory distress [Heart Sounds] : normal S1 and S2 [Abdomen Soft] : soft [Abdomen Tenderness] : non-tender [No CVA Tenderness] : no ~M costovertebral angle tenderness [Abnormal Walk] : normal gait [FreeTextEntry1] : obese, some leg edema no papilledema on exam  [Motor Strength Upper Extremities Bilaterally] : strength was normal in both upper extremities [Motor Strength Lower Extremities Bilaterally] : strength was normal in both lower extremities

## 2020-01-27 NOTE — HISTORY OF PRESENT ILLNESS
[FreeTextEntry1] : Concern for prior missed appts, 40min late today.  insurance issues.  \par Pre review of records:\par \par ***UPDATE:1/24/20***\par Ms Kyleigh Pineda was 30 minutes late for appointment again today\par Describes some issues with short term memory\par She reports 2 brief staring episodes since last office viist (last event was 2 months ago) both triggered by stress\par kierra itchiness of scalp and hair loss\par \par Zonisamide 400mg daily\par Levetiracetam 750mg BID\par \par ***UPDATE:7/12/19***\par 30 minutes late for appointment today*\par Meds:  LEV 750mg bid, inc in [facility] on d/c.\par she stopped Zonisamide in January due to headaches?\par \par Events as of late:  No ADRs.  Still having events, milder  Feels eyes feel funny sometimes, blurriness intermittently.  Infrequent HA top of head.  No AM HA.  One staring episode in last 3mo with feeling overwhelmed. \par \par HPI: 44 yo F who presents for seizures. "convulsions" which began in January 2017. The first episode of convulsions occurred. She had urinary incontinence and severe tongue biting with laceration.  At that time she was admitted to [facility] and a full seizure workup was reportedly completed including an MRI and was reportedly "negative." Sometimes she also has staring episodes with associated clicking of her tongue and mouth and looking at her right hand, after several minutes it resolves and the pt smiles. Most of her episodes occur while she is sleeping except the staring spells with tongue clicking and rubbing her fingers together b/l. \par \par At [facility] neuro clinic: 1/28/18 MRI with subtle left temporal tip and posterior hippocampal FLAIR changes\par \par NKDA\par ROS - numbness in toes and sometimes has difficulty finding words quickly over the past few months, also depressed mood. Denies problems talking, walking, weakness, word finding difficulties, dizziness, headache, lightheadness\par Social - denies toxic habits, nonsmoker\par

## 2020-02-21 ENCOUNTER — OUTPATIENT (OUTPATIENT)
Dept: OUTPATIENT SERVICES | Facility: HOSPITAL | Age: 45
LOS: 1 days | End: 2020-02-21
Payer: MEDICAID

## 2020-02-21 ENCOUNTER — APPOINTMENT (OUTPATIENT)
Dept: INTERNAL MEDICINE | Facility: CLINIC | Age: 45
End: 2020-02-21
Payer: MEDICAID

## 2020-02-21 VITALS
WEIGHT: 231 LBS | DIASTOLIC BLOOD PRESSURE: 80 MMHG | BODY MASS INDEX: 43.61 KG/M2 | OXYGEN SATURATION: 94 % | SYSTOLIC BLOOD PRESSURE: 118 MMHG | HEIGHT: 61 IN | HEART RATE: 88 BPM

## 2020-02-21 DIAGNOSIS — N91.2 AMENORRHEA, UNSPECIFIED: ICD-10-CM

## 2020-02-21 DIAGNOSIS — R14.3 FLATULENCE: ICD-10-CM

## 2020-02-21 DIAGNOSIS — I10 ESSENTIAL (PRIMARY) HYPERTENSION: ICD-10-CM

## 2020-02-21 PROCEDURE — G0463: CPT

## 2020-02-21 PROCEDURE — 99213 OFFICE O/P EST LOW 20 MIN: CPT | Mod: GE

## 2020-02-21 RX ORDER — HYDROCORTISONE 25 MG/ML
2.5 LOTION TOPICAL
Qty: 1 | Refills: 0 | Status: DISCONTINUED | COMMUNITY
Start: 2017-09-19 | End: 2020-02-21

## 2020-02-21 RX ORDER — SIMETHICONE 80 MG/1
80 TABLET, CHEWABLE ORAL
Qty: 60 | Refills: 0 | Status: ACTIVE | COMMUNITY
Start: 2020-02-21 | End: 1900-01-01

## 2020-02-21 RX ORDER — CHLORHEXIDINE GLUCONATE 4 %
325 (65 FE) LIQUID (ML) TOPICAL TWICE DAILY
Qty: 30 | Refills: 3 | Status: DISCONTINUED | COMMUNITY
Start: 2018-03-13 | End: 2020-02-21

## 2020-02-23 PROBLEM — R14.3 EXCESSIVE GAS: Status: ACTIVE | Noted: 2020-02-21

## 2020-02-23 PROBLEM — N91.2 AMENORRHEA: Status: ACTIVE | Noted: 2020-02-21

## 2020-02-24 NOTE — PHYSICAL EXAM
[Supple] : supple [No JVD] : no jugular venous distention [Pedal Pulses Present] : the pedal pulses are present [No Edema] : there was no peripheral edema [No Extremity Clubbing/Cyanosis] : no extremity clubbing/cyanosis [Normal] : normal gait, coordination grossly intact, no focal deficits and deep tendon reflexes were 2+ and symmetric [de-identified] : Areas of dark discoloration on middle of wrist (dorsal > whalen aspect); bracelets noted on wrist in somewhat similar distribution; ~1 cm dark nevus non-raised on L palm

## 2020-02-24 NOTE — REVIEW OF SYSTEMS
[Negative] : Psychiatric [Fever] : no fever [Chills] : no chills [Discharge] : no discharge [Pain] : no pain [Redness] : no redness [Vision Problems] : no vision problems [Itching] : no itching [Earache] : no earache [Hearing Loss] : no hearing loss [Nasal Discharge] : no nasal discharge [Sore Throat] : no sore throat [Postnasal Drip] : no postnasal drip [Chest Pain] : no chest pain [Lower Ext Edema] : no lower extremity edema [Palpitations] : no palpitations [Shortness Of Breath] : no shortness of breath [Wheezing] : no wheezing [Cough] : no cough [Abdominal Pain] : no abdominal pain [Nausea] : no nausea [Vomiting] : no vomiting [Constipation] : no constipation [Dysuria] : no dysuria [Melena] : no melena [Hematuria] : no hematuria [Joint Pain] : no joint pain [Muscle Weakness] : no muscle weakness [Joint Stiffness] : no joint stiffness [Joint Swelling] : no joint swelling [Fainting] : no fainting [Headache] : no headache [Unsteady Walking] : no ataxia [FreeTextEntry2] : intentional weight loss [Depression] : no depression [FreeTextEntry7] : diarrhea sometimes with bloating/excessive gas [FreeTextEntry8] : have not had menstrual period for past 4 months [FreeTextEntry9] : left lower back pain at times, but none today [de-identified] : short term memory loss; dizziness aura prior to seizures [de-identified] : Recently more stressful/emotional due to ending of relationship [de-identified] : rash on R wrist that is "spreading"

## 2020-02-24 NOTE — HISTORY OF PRESENT ILLNESS
[FreeTextEntry1] : annual CPE [de-identified] : ***pt arrived 30 minutes late to appointment***\par \par 44 y/o F with PMH of seizure disorder (on Keppra and Zonegran), HTN (on triamterene-HCTZ, enalipril, amlodipine), and morbid obesity (BMI 44 in Jan 2020) presenting for her annual CPE. Last seen in clinic in Oct 2019 for follow-up regarding concerns of recurrent R wrist rash, which was reported to be improved (believed to be component of contact dermatitis from jewelry). \par \par #Seizure disorder - Followed up with Neurology last month, with subsequent recommendations to see Neuropsych for evaluation of her short term memory issues (which pt states was found to be normal). Was also emphasized to pt that she should not be driving until seizure free for 1 year. Pt reports having seizures less often (every 3 months or so), and usually feels a dizziness aura prior to it. As per pt, last episode was 2 months ago, and usually only occurs when she is tired.\par \par #R wrist rash - pt reports that since last visit, the rash has worsened in the sense that it is now on the dorsal aspect of her wrist as well (previously only whalen aspect of wrist). States that she could not get a Dermatology appointment because it was all booked / did not work with her schedule. Rash is found no where else on the body but pt is worried that it might spread. Pt is hesitant about not wearing jewelry on her R wrist.\par \par #Amenorrhea - Pt reports not having menstruation for 4 months now. Has not been tested for pregnancy as she does not believe she is pregnant, stating "I have not done anything." She notes that she has also been trying to get an appointment with GYN, but has been unsuccessful in scheduling one. Notes that she will try again today.\par \par #Excessive gas/bloating - Reports having a lot of gas and bloating at times. States that she loves cheese and reports having diarrhea, but unsure if it always coincides with consuming dairy.\par \par #Chronic cold intolerance - reports often feeling cold\par \par #Hair loss on scalp- reports that it is not much of a problem now that she started taking biotin supplements, which she believes has really helped.\par \par #"Green fungus" on nails - intermittent yellowish-green discoloration of nails reported and seen during Oct 2019 visit, with pt stating she did not touch or scratch anything to have caused it. However, today states that it has not been an issue recently.\par \par Denies f/c/n/v, CP, SOB, abdominal pain, dysuria, hematuria, hematochezia, melena, constipation.

## 2020-02-24 NOTE — ASSESSMENT
[FreeTextEntry1] : 44 y/o F with PMH of seizure disorder (on Keppra and Zonisamide), HTN (on triamterene-HCTZ, enalipril, amlodipine), and morbid obesity (BMI 44 in Jan 2020) presenting for her annual CPE.\par \par #Seizure disorder\par -Last episode 2 months ago\par -Follows with Neurology\par -As per Neurology, complex partial w/ secondary generalization and concern for medical intractability\par -c/w Keppra 750mg BID and Zonisamide 400mg daily (as per Neurology, but pt states she currently only takes 200mg daily)\par -pt states that she currently still drives. Re-emphasized the dangers of driving for herself and for others due her condition. Stated that she should be seizure-free for 1 year before driving again. Pt voiced understanding, but may need further reinforcement.\par \par #R wrist rash, recurrent\par -may be 2/2 contact dermatitis from jewelry (pt hesitant about not wearing jewelry on her R wrist)\par -Dermatology referral given for further evaluation\par \par #Amenorrhea\par -No menstruation for past 4-months\par -f/u serum HCG \par -GYN referral given for further evaluation\par \par #Excessive gas/bloating c/b diarrhea\par -possibly 2/2 lactose intolerance\par -advised pt to reduce dairy intake, especially cheese\par -advised pt that she can try OTC Lactaid pills and/or Simethicone to see if there is improvement\par \par #Short term memory issues\par -Recommended by Neurology for evaluation by Neuropsych\par -As per pt, was evaluated by Neuropsych and found memory to be normal\par -Advised pt that she can try taking multivitamins to see if there is improvement\par \par #Hair loss on scalp / chronic cold intolerance\par -c/w biotin supplements for hair loss as pt reports improvement\par -hair loss can also be evaluated by Dermatology when seen for R wrist rash\par -f/u TSH\par \par #Morbid obesity\par -11 lb intentional weight loss since Oct 2019\par -encouraged further weight loss with lifestyle modifications including diet and exercise\par -c/w Zonisamide as it can help with weight loss\par \par #HTN\par -well-controlled\par -c/w regimen of triamterene-HCTZ, enalipril, and amlodipine\par \par #HCM\par -Flu vaccine received 10/15/19\par -Breast cancer screening: Mammogram ordered\par -Cervical cancer screening: last on 9/13/17\par -Depression screening: score of 0 on 10/15/19\par -Skin cancer screening: Dermatology referral given for skin cancer screening and monitoring of large nevus on L palm\par -Labs ordered: CBC, CMP, TSH, HbA1c, Lipid profile, Serum HCG\par -Dental health: Dental referral given\par \par RTC in 1 year for annual CPE or sooner PRN\par d/w Dr. Stoddard

## 2020-02-25 DIAGNOSIS — R56.9 UNSPECIFIED CONVULSIONS: ICD-10-CM

## 2020-02-25 DIAGNOSIS — R21 RASH AND OTHER NONSPECIFIC SKIN ERUPTION: ICD-10-CM

## 2020-02-25 DIAGNOSIS — L65.9 NONSCARRING HAIR LOSS, UNSPECIFIED: ICD-10-CM

## 2020-02-25 DIAGNOSIS — G40.219 LOCALIZATION-RELATED (FOCAL) (PARTIAL) SYMPTOMATIC EPILEPSY AND EPILEPTIC SYNDROMES WITH COMPLEX PARTIAL SEIZURES, INTRACTABLE, WITHOUT STATUS EPILEPTICUS: ICD-10-CM

## 2020-02-25 DIAGNOSIS — N91.2 AMENORRHEA, UNSPECIFIED: ICD-10-CM

## 2020-02-25 DIAGNOSIS — R14.3 FLATULENCE: ICD-10-CM

## 2020-02-26 ENCOUNTER — RX RENEWAL (OUTPATIENT)
Age: 45
End: 2020-02-26

## 2020-03-02 ENCOUNTER — LABORATORY RESULT (OUTPATIENT)
Age: 45
End: 2020-03-02

## 2020-03-03 ENCOUNTER — LABORATORY RESULT (OUTPATIENT)
Age: 45
End: 2020-03-03

## 2020-03-03 ENCOUNTER — OUTPATIENT (OUTPATIENT)
Dept: OUTPATIENT SERVICES | Facility: HOSPITAL | Age: 45
LOS: 1 days | End: 2020-03-03
Payer: MEDICAID

## 2020-03-03 ENCOUNTER — APPOINTMENT (OUTPATIENT)
Dept: OBGYN | Facility: CLINIC | Age: 45
End: 2020-03-03
Payer: MEDICAID

## 2020-03-03 VITALS — WEIGHT: 228 LBS | BODY MASS INDEX: 43.08 KG/M2 | DIASTOLIC BLOOD PRESSURE: 80 MMHG | SYSTOLIC BLOOD PRESSURE: 120 MMHG

## 2020-03-03 DIAGNOSIS — N76.0 ACUTE VAGINITIS: ICD-10-CM

## 2020-03-03 DIAGNOSIS — Z01.419 ENCOUNTER FOR GYNECOLOGICAL EXAMINATION (GENERAL) (ROUTINE) W/OUT ABNORMAL FINDINGS: ICD-10-CM

## 2020-03-03 PROCEDURE — 36415 COLL VENOUS BLD VENIPUNCTURE: CPT

## 2020-03-03 PROCEDURE — 87591 N.GONORRHOEAE DNA AMP PROB: CPT

## 2020-03-03 PROCEDURE — 86780 TREPONEMA PALLIDUM: CPT

## 2020-03-03 PROCEDURE — 87624 HPV HI-RISK TYP POOLED RSLT: CPT

## 2020-03-03 PROCEDURE — G0463: CPT

## 2020-03-03 PROCEDURE — 87389 HIV-1 AG W/HIV-1&-2 AB AG IA: CPT

## 2020-03-03 PROCEDURE — 99214 OFFICE O/P EST MOD 30 MIN: CPT

## 2020-03-03 PROCEDURE — 87340 HEPATITIS B SURFACE AG IA: CPT

## 2020-03-03 PROCEDURE — 87491 CHLMYD TRACH DNA AMP PROBE: CPT

## 2020-03-04 LAB
C TRACH RRNA SPEC QL NAA+PROBE: SIGNIFICANT CHANGE UP
HBV SURFACE AG SER-ACNC: SIGNIFICANT CHANGE UP
HIV 1+2 AB+HIV1 P24 AG SERPL QL IA: SIGNIFICANT CHANGE UP
HPV HIGH+LOW RISK DNA PNL CVX: SIGNIFICANT CHANGE UP
N GONORRHOEA RRNA SPEC QL NAA+PROBE: SIGNIFICANT CHANGE UP
SPECIMEN SOURCE: SIGNIFICANT CHANGE UP
T PALLIDUM AB TITR SER: NEGATIVE — SIGNIFICANT CHANGE UP

## 2020-03-04 NOTE — PHYSICAL EXAM
[Awake] : awake [Alert] : alert [Soft] : soft [Oriented x3] : oriented to person, place, and time [Labia Majora] : labia major [Normal] : cervix [RRR, No Murmurs] : RRR, no murmurs [Nulliparous] : was nulliparous [CTAB] : CTAB [Acute Distress] : no acute distress [Mass] : no breast mass [Nipple Discharge] : no nipple discharge [Axillary LAD] : no axillary lymphadenopathy [Tender] : non tender [Distended] : not distended [H/Smegaly] : no hepatosplenomegaly [Depressed Mood] : not depressed [Flat Affect] : affect not flat [FreeTextEntry7] : limited evaluation due to patient's body habitus

## 2020-03-06 LAB — CYTOLOGY SPEC DOC CYTO: SIGNIFICANT CHANGE UP

## 2020-03-17 DIAGNOSIS — E66.01 MORBID (SEVERE) OBESITY DUE TO EXCESS CALORIES: ICD-10-CM

## 2020-03-17 DIAGNOSIS — N93.9 ABNORMAL UTERINE AND VAGINAL BLEEDING, UNSPECIFIED: ICD-10-CM

## 2020-03-25 LAB
ALBUMIN SERPL ELPH-MCNC: 4.3 G/DL
ALP BLD-CCNC: 94 U/L
ALT SERPL-CCNC: 14 U/L
ANION GAP SERPL CALC-SCNC: 19 MMOL/L
AST SERPL-CCNC: 12 U/L
BASOPHILS # BLD AUTO: 0.03 K/UL
BASOPHILS NFR BLD AUTO: 0.5 %
BILIRUB SERPL-MCNC: <0.2 MG/DL
BUN SERPL-MCNC: 13 MG/DL
CALCIUM SERPL-MCNC: 10.1 MG/DL
CHLORIDE SERPL-SCNC: 105 MMOL/L
CHOLEST SERPL-MCNC: 178 MG/DL
CHOLEST/HDLC SERPL: 2.5 RATIO
CO2 SERPL-SCNC: 21 MMOL/L
CREAT SERPL-MCNC: 1.17 MG/DL
EOSINOPHIL # BLD AUTO: 0.06 K/UL
EOSINOPHIL NFR BLD AUTO: 1 %
ESTIMATED AVERAGE GLUCOSE: 123 MG/DL
GLUCOSE SERPL-MCNC: 91 MG/DL
HBA1C MFR BLD HPLC: 5.9 %
HCG SERPL QL: NEGATIVE
HCT VFR BLD CALC: 40.6 %
HDLC SERPL-MCNC: 71 MG/DL
HGB BLD-MCNC: 11.7 G/DL
IMM GRANULOCYTES NFR BLD AUTO: 0.2 %
LDLC SERPL CALC-MCNC: 88 MG/DL
LYMPHOCYTES # BLD AUTO: 1.6 K/UL
LYMPHOCYTES NFR BLD AUTO: 26.9 %
MAN DIFF?: NORMAL
MCHC RBC-ENTMCNC: 24.2 PG
MCHC RBC-ENTMCNC: 28.8 GM/DL
MCV RBC AUTO: 83.9 FL
MONOCYTES # BLD AUTO: 0.56 K/UL
MONOCYTES NFR BLD AUTO: 9.4 %
NEUTROPHILS # BLD AUTO: 3.68 K/UL
NEUTROPHILS NFR BLD AUTO: 62 %
PAPP-A SERPL-ACNC: 1 MIU/ML
PLATELET # BLD AUTO: 240 K/UL
POTASSIUM SERPL-SCNC: 3.7 MMOL/L
PROT SERPL-MCNC: 8 G/DL
RBC # BLD: 4.84 M/UL
RBC # FLD: 14.9 %
SODIUM SERPL-SCNC: 145 MMOL/L
TRIGL SERPL-MCNC: 96 MG/DL
TSH SERPL-ACNC: 1.63 UIU/ML
WBC # FLD AUTO: 5.94 K/UL

## 2020-04-03 ENCOUNTER — APPOINTMENT (OUTPATIENT)
Dept: NEUROLOGY | Facility: CLINIC | Age: 45
End: 2020-04-03

## 2020-04-24 ENCOUNTER — APPOINTMENT (OUTPATIENT)
Dept: INTERNAL MEDICINE | Facility: CLINIC | Age: 45
End: 2020-04-24

## 2020-05-04 ENCOUNTER — APPOINTMENT (OUTPATIENT)
Dept: INTERNAL MEDICINE | Facility: CLINIC | Age: 45
End: 2020-05-04

## 2020-05-04 ENCOUNTER — OUTPATIENT (OUTPATIENT)
Dept: OUTPATIENT SERVICES | Facility: HOSPITAL | Age: 45
LOS: 1 days | End: 2020-05-04
Payer: MEDICAID

## 2020-05-04 DIAGNOSIS — B35.1 TINEA UNGUIUM: ICD-10-CM

## 2020-05-04 PROCEDURE — G0463: CPT

## 2020-05-06 DIAGNOSIS — I10 ESSENTIAL (PRIMARY) HYPERTENSION: ICD-10-CM

## 2020-06-10 ENCOUNTER — APPOINTMENT (OUTPATIENT)
Dept: DERMATOLOGY | Facility: CLINIC | Age: 45
End: 2020-06-10
Payer: MEDICAID

## 2020-06-10 VITALS — HEIGHT: 62 IN | BODY MASS INDEX: 46.93 KG/M2 | WEIGHT: 255 LBS

## 2020-06-10 DIAGNOSIS — Z12.83 ENCOUNTER FOR SCREENING FOR MALIGNANT NEOPLASM OF SKIN: ICD-10-CM

## 2020-06-10 DIAGNOSIS — R21 RASH AND OTHER NONSPECIFIC SKIN ERUPTION: ICD-10-CM

## 2020-06-10 DIAGNOSIS — L82.1 OTHER SEBORRHEIC KERATOSIS: ICD-10-CM

## 2020-06-10 DIAGNOSIS — I83.93 ASYMPTOMATIC VARICOSE VEINS OF BILATERAL LOWER EXTREMITIES: ICD-10-CM

## 2020-06-10 DIAGNOSIS — D22.9 MELANOCYTIC NEVI, UNSPECIFIED: ICD-10-CM

## 2020-06-10 DIAGNOSIS — L65.0 TELOGEN EFFLUVIUM: ICD-10-CM

## 2020-06-10 PROCEDURE — 99215 OFFICE O/P EST HI 40 MIN: CPT

## 2020-06-10 RX ORDER — HYDROCORTISONE 25 MG/G
2.5 OINTMENT TOPICAL
Qty: 1 | Refills: 0 | Status: ACTIVE | COMMUNITY
Start: 2020-06-10 | End: 1900-01-01

## 2020-06-16 RX ORDER — CLINDAMYCIN PHOSPHATE 10 MG/ML
1 LOTION TOPICAL TWICE DAILY
Qty: 1 | Refills: 0 | Status: ACTIVE | COMMUNITY
Start: 2017-09-19 | End: 1900-01-01

## 2020-07-09 ENCOUNTER — APPOINTMENT (OUTPATIENT)
Dept: DERMATOLOGY | Facility: HOSPITAL | Age: 45
End: 2020-07-09

## 2020-07-13 ENCOUNTER — RX RENEWAL (OUTPATIENT)
Age: 45
End: 2020-07-13

## 2020-08-11 ENCOUNTER — APPOINTMENT (OUTPATIENT)
Dept: NEUROLOGY | Facility: CLINIC | Age: 45
End: 2020-08-11
Payer: MEDICAID

## 2020-08-11 PROCEDURE — 96116 NUBHVL XM PHYS/QHP 1ST HR: CPT | Mod: 95

## 2020-09-01 ENCOUNTER — OUTPATIENT (OUTPATIENT)
Dept: OUTPATIENT SERVICES | Facility: HOSPITAL | Age: 45
LOS: 1 days | End: 2020-09-01
Payer: MEDICAID

## 2020-09-01 ENCOUNTER — APPOINTMENT (OUTPATIENT)
Dept: INTERNAL MEDICINE | Facility: CLINIC | Age: 45
End: 2020-09-01
Payer: MEDICAID

## 2020-09-01 VITALS
HEIGHT: 62 IN | BODY MASS INDEX: 40.85 KG/M2 | SYSTOLIC BLOOD PRESSURE: 134 MMHG | WEIGHT: 222 LBS | DIASTOLIC BLOOD PRESSURE: 80 MMHG

## 2020-09-01 DIAGNOSIS — I10 ESSENTIAL (PRIMARY) HYPERTENSION: ICD-10-CM

## 2020-09-01 DIAGNOSIS — R49.9 UNSPECIFIED VOICE AND RESONANCE DISORDER: ICD-10-CM

## 2020-09-01 DIAGNOSIS — R56.9 UNSPECIFIED CONVULSIONS: ICD-10-CM

## 2020-09-01 PROCEDURE — 99213 OFFICE O/P EST LOW 20 MIN: CPT | Mod: GE

## 2020-09-01 PROCEDURE — G0463: CPT

## 2020-09-01 NOTE — PHYSICAL EXAM
[Normal] : the outer ears and nose were normal in appearance and the oropharynx was normal [Normal Outer Ear/Nose] : the outer ears and nose were normal in appearance [Normal Oropharynx] : the oropharynx was normal [No Respiratory Distress] : no respiratory distress  [No Accessory Muscle Use] : no accessory muscle use [Clear to Auscultation] : lungs were clear to auscultation bilaterally [Normal Rate] : normal rate  [Regular Rhythm] : with a regular rhythm [Normal S1, S2] : normal S1 and S2

## 2020-09-12 PROBLEM — R49.9 CHANGE IN VOICE: Status: ACTIVE | Noted: 2020-09-01

## 2020-09-12 NOTE — HISTORY OF PRESENT ILLNESS
[de-identified] : 44 y/o female with pmh of seizure disorder (Keppra & Zonegram), HTN (traimterene-HCTZ, enalipril, amlodipine), morbid obesity, and prior COVID-19 infection (3/2020) here today due to throat concerns. Recent COVID-19 infection in 03/2020, states that has had voice changes since. Voice cannot project as well, more trembling. Sings at a Mu-ism, feels her voice id different. States she has dryness in her throat, with associated mild nonproductive coughing. Denies dysphagia, or loss of taste or smell.\par When she did have COVID she had nausea, cough, dizziness, elevated temperature, and diarrhea. Was hospitalized and intubated 2x.\par Currently she is asymptomatic, except for a mild cough, intermittent. She has no fever, chills, night sweats. No nausea, vomiting, or diarrhea. No chest pain, palpitations, or SOB.\par \par \par

## 2020-09-12 NOTE — REVIEW OF SYSTEMS
[Cough] : cough [Negative] : Integumentary [Fever] : no fever [Fatigue] : no fatigue [Chills] : no chills [Night Sweats] : no night sweats [Hoarseness] : no hoarseness [Sore Throat] : no sore throat [Chest Pain] : no chest pain [Lower Ext Edema] : no lower extremity edema [Shortness Of Breath] : no shortness of breath [Wheezing] : no wheezing [Abdominal Pain] : no abdominal pain [Nausea] : no nausea [FreeTextEntry4] : Voice change since COVID-19 [Vomiting] : no vomiting [FreeTextEntry6] : Mild cough

## 2020-09-12 NOTE — PLAN
[FreeTextEntry1] : # Voice changes\par - Recent COVID-19 infection, hx of intubation x2 on admission\par - No throat erythema, drainage, or nodules noted on oropharynx\par - May be 2/2 to a combination of prior COVID infection and no vocal practice for months\par - ENT referral\par \par # Seizure Disorder\par - No recent seizure activity\par - On Keppra and Zonegram\par \par # HTN\par - /80\par - On Triamterene-HCTZ, Enalipril, and Amlodipine\par \par # Morbid Obesity\par - Weight loss secondary to COVID-19\par - Encouraged her with lifestyle modifications\par \par \par \par \par

## 2020-09-12 NOTE — ASSESSMENT
[FreeTextEntry1] : 44 y/o female with pmh of seizure disorder (Keppra & Zonegram), HTN (traimterene-HCTZ, enalipril, amlodipine), morbid obesity, and prior COVID-19 infection (3/2020), here today due to voice concerns. P.E. unremarkable for any throat erythema, drainage, or swelling.

## 2020-09-14 ENCOUNTER — RX RENEWAL (OUTPATIENT)
Age: 45
End: 2020-09-14

## 2020-11-03 ENCOUNTER — OUTPATIENT (OUTPATIENT)
Dept: OUTPATIENT SERVICES | Facility: HOSPITAL | Age: 45
LOS: 1 days | End: 2020-11-03
Payer: MEDICAID

## 2020-11-03 ENCOUNTER — APPOINTMENT (OUTPATIENT)
Dept: OBGYN | Facility: CLINIC | Age: 45
End: 2020-11-03

## 2020-11-03 ENCOUNTER — APPOINTMENT (OUTPATIENT)
Dept: INTERNAL MEDICINE | Facility: CLINIC | Age: 45
End: 2020-11-03
Payer: MEDICAID

## 2020-11-03 DIAGNOSIS — R56.9 UNSPECIFIED CONVULSIONS: ICD-10-CM

## 2020-11-03 DIAGNOSIS — R49.9 UNSPECIFIED VOICE AND RESONANCE DISORDER: ICD-10-CM

## 2020-11-03 DIAGNOSIS — Z23 ENCOUNTER FOR IMMUNIZATION: ICD-10-CM

## 2020-11-03 DIAGNOSIS — I10 ESSENTIAL (PRIMARY) HYPERTENSION: ICD-10-CM

## 2020-11-03 PROCEDURE — G0008: CPT

## 2020-11-03 PROCEDURE — 99072 ADDL SUPL MATRL&STAF TM PHE: CPT

## 2020-11-03 PROCEDURE — 90686 IIV4 VACC NO PRSV 0.5 ML IM: CPT

## 2020-11-03 PROCEDURE — 90656 IIV3 VACC NO PRSV 0.5 ML IM: CPT

## 2020-11-23 ENCOUNTER — APPOINTMENT (OUTPATIENT)
Dept: OPHTHALMOLOGY | Facility: CLINIC | Age: 45
End: 2020-11-23

## 2020-12-15 PROBLEM — N76.0 BACTERIAL VAGINOSIS: Status: RESOLVED | Noted: 2017-09-14 | Resolved: 2020-12-15

## 2020-12-17 ENCOUNTER — RX RENEWAL (OUTPATIENT)
Age: 45
End: 2020-12-17

## 2020-12-21 DIAGNOSIS — Z11.59 ENCOUNTER FOR SCREENING FOR OTHER VIRAL DISEASES: ICD-10-CM

## 2020-12-28 ENCOUNTER — APPOINTMENT (OUTPATIENT)
Dept: NEUROLOGY | Facility: CLINIC | Age: 45
End: 2020-12-28
Payer: MEDICAID

## 2020-12-28 ENCOUNTER — NON-APPOINTMENT (OUTPATIENT)
Age: 45
End: 2020-12-28

## 2020-12-28 VITALS
BODY MASS INDEX: 41.96 KG/M2 | WEIGHT: 228 LBS | HEART RATE: 77 BPM | DIASTOLIC BLOOD PRESSURE: 94 MMHG | SYSTOLIC BLOOD PRESSURE: 141 MMHG | HEIGHT: 62 IN

## 2020-12-28 PROCEDURE — 99072 ADDL SUPL MATRL&STAF TM PHE: CPT

## 2020-12-28 PROCEDURE — 99214 OFFICE O/P EST MOD 30 MIN: CPT

## 2020-12-28 RX ORDER — ZONISAMIDE 100 MG/1
100 CAPSULE ORAL
Qty: 120 | Refills: 4 | Status: DISCONTINUED | COMMUNITY
Start: 2018-05-07 | End: 2020-12-28

## 2021-01-03 NOTE — HISTORY OF PRESENT ILLNESS
[FreeTextEntry1] : Concern for prior missed appts, 40min late today.  insurance issues.  \par Pre review of records:\par \par ***UPDATE:12/28/2020***\par Ms Kyleigh Pineda is here today for a scheduled follow up office visit She reports 2 episodes of dizziness. She is unable to respond during the episodes September and last event was November (stress is typically her trigger)\par \par Of note Ms Pineda was Covid positive in March of this year and was intubated for 11 days at [facility] ([facility]par \par \par Levetiracetam 750mg BID\par Zonisamide 400mg at bedtime\par \par ***UPDATE:1/24/20***\par Ms Kyleigh Pineda was 30 minutes late for appointment again today\par Describes some issues with short term memory\par She reports 2 brief staring episodes since last office viist (last event was 2 months ago) both triggered by stress\par kierra itchiness of scalp and hair loss\par \par Zonisamide 400mg daily\par Levetiracetam 750mg BID\par \par ***UPDATE:7/12/19***\par 30 minutes late for appointment today*\par Meds:  LEV 750mg bid, inc in [facility] on d/c.\par she stopped Zonisamide in January due to headaches?\par \par Events as of late:  No ADRs.  Still having events, milder  Feels eyes feel funny sometimes, blurriness intermittently.  Infrequent HA top of head.  No AM HA.  One staring episode in last 3mo with feeling overwhelmed. \par \par HPI: 44 yo F who presents for seizures. "convulsions" which began in January 2017. The first episode of convulsions occurred. She had urinary incontinence and severe tongue biting with laceration.  At that time she was admitted to [facility] and a full seizure workup was reportedly completed including an MRI and was reportedly "negative." Sometimes she also has staring episodes with associated clicking of her tongue and mouth and looking at her right hand, after several minutes it resolves and the pt smiles. Most of her episodes occur while she is sleeping except the staring spells with tongue clicking and rubbing her fingers together b/l. \par \par At [facility] neuro clinic: 1/28/18 MRI with subtle left temporal tip and posterior hippocampal FLAIR changes\par \par NKDA\par ROS - numbness in toes and sometimes has difficulty finding words quickly over the past few months, also depressed mood. Denies problems talking, walking, weakness, word finding difficulties, dizziness, headache, lightheadness\par Social - denies toxic habits, nonsmoker\par

## 2021-01-03 NOTE — DATA REVIEWED
[de-identified] : 1/28/18 Addendum: MRI with subtle left temporal tip and posterior hippocampal FLAIR changes [de-identified] : VEEG (1/2018) 3 day: shows left frontotemporal waveforms with spikes and waves in sleep. 3 left frontotemporal onset seizures with right sided spread.

## 2021-01-03 NOTE — DISCUSSION/SUMMARY
[Medically Refractory (seizure within the last year)] : Medically Refractory (seizure within the last year) [Complex Partial] : complex partial [Secondary Generalization] : secondary generalization [Focal] : focal [Symptomatic] : symptomatic [Risks Associated with Driving/[place] Law] : As per my usual protocol, the patient was advised in regards to risks and driving privileges associated with the [place] Guidelines.  [Safety Recommendations] : The patient was advised in regards to the risk of seizures and general seizure safety recommendations including not to be bathing alone, climbing to high places and operating heavy machinery. [Compliance with Medications] : The importance of compliance with medications was reinforced. [Medication Side Effects] : High frequency and serious potential medication adverse effects were reviewed with the patient, including but not exclusive to psychiatric effects.  Information sheets on medication side effects were made available to the patient in our clinic.  The patient or advocate agrees to notify us for any concerns. [Teratogenicity] : Risks associated with AED use in pregnancy, teratogenicity and methods of contraception were discussed.  [Surgical Options/Risks/Benefits] : Surgical options/risks/benefits for intractable epilepsy were discussed. [Risk of Death] : Risk of death associated with seizures / SUDEP was discussed. [Neuropsychological Testing] : Neuropsychological testing  was requested for evaluation of cognitive complaints, and possible implications in regards to presurgical epilepsy planning. 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  First episode was in January of 2017, recent onset.  GTCs but also staring spells with tongue clicking and b/l finger rubbing automatisms. \par \par Plan:\par Continue Keppra 750mg BID, Zonisamide 400mg daily\par - encouraged adequate fluids on ZNS to help avoid nephrolithiasis\par - reviewed seizure triggers i.e stress\par - annual labwork\par - Exercise RX- mindfulness ordered and sent to patient again\par - follow up in 4 months\par \par No driving until seizure free for 1 year, safety\par

## 2021-01-14 ENCOUNTER — APPOINTMENT (OUTPATIENT)
Dept: OBGYN | Facility: CLINIC | Age: 46
End: 2021-01-14
Payer: MEDICAID

## 2021-01-14 ENCOUNTER — OUTPATIENT (OUTPATIENT)
Dept: OUTPATIENT SERVICES | Facility: HOSPITAL | Age: 46
LOS: 1 days | End: 2021-01-14
Payer: MEDICAID

## 2021-01-14 VITALS — HEIGHT: 62 IN | BODY MASS INDEX: 41.77 KG/M2 | WEIGHT: 227 LBS

## 2021-01-14 VITALS — TEMPERATURE: 97.1 F

## 2021-01-14 DIAGNOSIS — N76.0 ACUTE VAGINITIS: ICD-10-CM

## 2021-01-14 DIAGNOSIS — N93.9 ABNORMAL UTERINE AND VAGINAL BLEEDING, UNSPECIFIED: ICD-10-CM

## 2021-01-14 PROCEDURE — 99212 OFFICE O/P EST SF 10 MIN: CPT

## 2021-01-14 PROCEDURE — G0463: CPT

## 2021-01-14 NOTE — PLAN
[FreeTextEntry1] : 1. AUB\par -discussed medical v surgical management of AUB\par -for TVUS\par -refusing blood draw today, understands recc to check TSH/prolactin, states negative UPT.\par -understands structural causes, patient will return in March for annual and get TVUS before to discuss\par -desires to maintain ferility\par -refusing hormonal management at this time, discussed \par \par Pauline Barba MD

## 2021-01-14 NOTE — HISTORY OF PRESENT ILLNESS
[FreeTextEntry1] : Patient is 46 years old here for AUB, states that she has been having  AUB for 1 year. States she still desires fertility. Patient was supposed to get a TVUS. Patient got dx with Covid back in March was intubated for 11 days. States that she lost her follow up after that. Patient was given progesterone last year and claims that she had a withdrawl bleed. A poor historian, overall cannot remember very well.   [Irregular Menstrual Interval] : irregular menstrual interval [TextBox_28] : Pt states that she has bleeding irregularly, cannot determine how often or for how long. Denies palpations, CP, SOB, dizziness.

## 2021-01-27 ENCOUNTER — RX RENEWAL (OUTPATIENT)
Age: 46
End: 2021-01-27

## 2021-02-11 ENCOUNTER — APPOINTMENT (OUTPATIENT)
Dept: ULTRASOUND IMAGING | Facility: CLINIC | Age: 46
End: 2021-02-11

## 2021-02-11 ENCOUNTER — RESULT REVIEW (OUTPATIENT)
Age: 46
End: 2021-02-11

## 2021-02-11 ENCOUNTER — OUTPATIENT (OUTPATIENT)
Dept: OUTPATIENT SERVICES | Facility: HOSPITAL | Age: 46
LOS: 1 days | End: 2021-02-11
Payer: MEDICAID

## 2021-02-11 DIAGNOSIS — N93.9 ABNORMAL UTERINE AND VAGINAL BLEEDING, UNSPECIFIED: ICD-10-CM

## 2021-02-11 PROCEDURE — 76830 TRANSVAGINAL US NON-OB: CPT | Mod: 26

## 2021-02-11 PROCEDURE — 76830 TRANSVAGINAL US NON-OB: CPT

## 2021-02-22 ENCOUNTER — NON-APPOINTMENT (OUTPATIENT)
Age: 46
End: 2021-02-22

## 2021-02-23 ENCOUNTER — NON-APPOINTMENT (OUTPATIENT)
Age: 46
End: 2021-02-23

## 2021-02-23 ENCOUNTER — APPOINTMENT (OUTPATIENT)
Dept: OPHTHALMOLOGY | Facility: CLINIC | Age: 46
End: 2021-02-23
Payer: MEDICAID

## 2021-02-23 LAB
ALBUMIN SERPL ELPH-MCNC: 3.8 G/DL
ALP BLD-CCNC: 90 U/L
ALT SERPL-CCNC: 10 U/L
ANION GAP SERPL CALC-SCNC: 12 MMOL/L
AST SERPL-CCNC: 11 U/L
BASOPHILS # BLD AUTO: 0.03 K/UL
BASOPHILS NFR BLD AUTO: 0.7 %
BILIRUB DIRECT SERPL-MCNC: 0 MG/DL
BILIRUB INDIRECT SERPL-MCNC: NORMAL MG/DL
BILIRUB SERPL-MCNC: <0.2 MG/DL
BUN SERPL-MCNC: 16 MG/DL
CALCIUM SERPL-MCNC: 10 MG/DL
CHLORIDE SERPL-SCNC: 106 MMOL/L
CO2 SERPL-SCNC: 24 MMOL/L
CREAT SERPL-MCNC: 1.29 MG/DL
EOSINOPHIL # BLD AUTO: 0.05 K/UL
EOSINOPHIL NFR BLD AUTO: 1.1 %
GLUCOSE SERPL-MCNC: 97 MG/DL
HCT VFR BLD CALC: 38.6 %
HGB BLD-MCNC: 11 G/DL
IMM GRANULOCYTES NFR BLD AUTO: 0 %
LEVETIRACETAM SERPL-MCNC: 33 UG/ML
LYMPHOCYTES # BLD AUTO: 1.18 K/UL
LYMPHOCYTES NFR BLD AUTO: 26.6 %
MAN DIFF?: NORMAL
MCHC RBC-ENTMCNC: 24.6 PG
MCHC RBC-ENTMCNC: 28.5 GM/DL
MCV RBC AUTO: 86.2 FL
MONOCYTES # BLD AUTO: 0.45 K/UL
MONOCYTES NFR BLD AUTO: 10.1 %
NEUTROPHILS # BLD AUTO: 2.73 K/UL
NEUTROPHILS NFR BLD AUTO: 61.5 %
PLATELET # BLD AUTO: 241 K/UL
POTASSIUM SERPL-SCNC: 3.5 MMOL/L
PROT SERPL-MCNC: 7.7 G/DL
RBC # BLD: 4.48 M/UL
RBC # FLD: 14.4 %
SODIUM SERPL-SCNC: 142 MMOL/L
WBC # FLD AUTO: 4.44 K/UL
ZONISAMIDE SERPL-MCNC: 10.8 UG/ML

## 2021-02-23 PROCEDURE — 92004 COMPRE OPH EXAM NEW PT 1/>: CPT

## 2021-02-23 PROCEDURE — 99072 ADDL SUPL MATRL&STAF TM PHE: CPT

## 2021-02-23 PROCEDURE — 92015 DETERMINE REFRACTIVE STATE: CPT

## 2021-03-09 ENCOUNTER — NON-APPOINTMENT (OUTPATIENT)
Age: 46
End: 2021-03-09

## 2021-05-03 ENCOUNTER — RX RENEWAL (OUTPATIENT)
Age: 46
End: 2021-05-03

## 2021-06-21 ENCOUNTER — NON-APPOINTMENT (OUTPATIENT)
Age: 46
End: 2021-06-21

## 2021-06-24 ENCOUNTER — RX RENEWAL (OUTPATIENT)
Age: 46
End: 2021-06-24

## 2021-07-09 ENCOUNTER — RX RENEWAL (OUTPATIENT)
Age: 46
End: 2021-07-09

## 2021-09-01 ENCOUNTER — APPOINTMENT (OUTPATIENT)
Dept: OTOLARYNGOLOGY | Facility: CLINIC | Age: 46
End: 2021-09-01
Payer: MEDICAID

## 2021-09-01 VITALS
SYSTOLIC BLOOD PRESSURE: 121 MMHG | BODY MASS INDEX: 41.41 KG/M2 | TEMPERATURE: 97.2 F | HEIGHT: 62 IN | HEART RATE: 80 BPM | WEIGHT: 225 LBS | DIASTOLIC BLOOD PRESSURE: 86 MMHG

## 2021-09-01 DIAGNOSIS — R09.89 OTHER SPECIFIED SYMPTOMS AND SIGNS INVOLVING THE CIRCULATORY AND RESPIRATORY SYSTEMS: ICD-10-CM

## 2021-09-01 DIAGNOSIS — K21.9 GASTRO-ESOPHAGEAL REFLUX DISEASE W/OUT ESOPHAGITIS: ICD-10-CM

## 2021-09-01 PROCEDURE — 99204 OFFICE O/P NEW MOD 45 MIN: CPT | Mod: 25

## 2021-09-01 PROCEDURE — 31575 DIAGNOSTIC LARYNGOSCOPY: CPT

## 2021-09-01 NOTE — HISTORY OF PRESENT ILLNESS
[de-identified] : 45 y/o F, notes she was intubated in March of 2020 for COVID-19 infection.  She thinks a molar was broken during extubation and since then feels she has a tooth stuck in her throat.  She notes it affects her voice.  Able to eat and drink without difficulty.  Pos feeling of GERD and throat clearing, also pos belching.  Not taking any reflux medication.

## 2021-09-01 NOTE — ASSESSMENT
[FreeTextEntry1] : Globus, LPRD,  No foreign body noted:\par - Start Omeprazole x 1 month\par - If not improved in one month will consider CT scan. \par - F/U 4 weeks.

## 2021-09-01 NOTE — CONSULT LETTER
[Dear  ___] : Dear  [unfilled], [Consult Letter:] : I had the pleasure of evaluating your patient, [unfilled]. [Please see my note below.] : Please see my note below. [Consult Closing:] : Thank you very much for allowing me to participate in the care of this patient.  If you have any questions, please do not hesitate to contact me. [Sincerely,] : Sincerely, [FreeTextEntry3] : Mya Pandey M.D.\par Attending Physician,  \par Department of Otolaryngology - Head and Neck Surgery\par [facility] \par Office: [phone]\par Fax: [phone]\par

## 2021-09-01 NOTE — END OF VISIT
[FreeTextEntry3] : I personally saw and examined REYES MCMAHON in detail.  I spoke to LEDA Billy regarding the assessment and plan of care. I performed the procedures and relevant physical exam.  I have reviewed the above assessment and plan of care and I agree.  I have made changes to the body of the note wherever necessary and appropriate.

## 2021-09-01 NOTE — PROCEDURE
[de-identified] : Flexible scope #28 used. Passed through nasal passage and nasopharynx/oropharynx/hypopharynx clear. Supraglottis normal. Glottis with fully mobile vocal cords without lesions or masses. Visualized subglottis clear. Postcricoid area with erythema and edema. No pooling of secretions.  No foreign body\par \par

## 2021-09-14 ENCOUNTER — LABORATORY RESULT (OUTPATIENT)
Age: 46
End: 2021-09-14

## 2021-09-14 ENCOUNTER — NON-APPOINTMENT (OUTPATIENT)
Age: 46
End: 2021-09-14

## 2021-09-14 ENCOUNTER — OUTPATIENT (OUTPATIENT)
Dept: OUTPATIENT SERVICES | Facility: HOSPITAL | Age: 46
LOS: 1 days | End: 2021-09-14
Payer: MEDICAID

## 2021-09-14 ENCOUNTER — APPOINTMENT (OUTPATIENT)
Dept: INTERNAL MEDICINE | Facility: CLINIC | Age: 46
End: 2021-09-14
Payer: MEDICAID

## 2021-09-14 VITALS
OXYGEN SATURATION: 98 % | DIASTOLIC BLOOD PRESSURE: 80 MMHG | BODY MASS INDEX: 44.16 KG/M2 | HEART RATE: 90 BPM | WEIGHT: 240 LBS | SYSTOLIC BLOOD PRESSURE: 110 MMHG | HEIGHT: 62 IN

## 2021-09-14 DIAGNOSIS — I10 ESSENTIAL (PRIMARY) HYPERTENSION: ICD-10-CM

## 2021-09-14 LAB
HCT VFR BLD CALC: 35.8 % — SIGNIFICANT CHANGE UP (ref 34.5–45)
HGB BLD-MCNC: 11.2 G/DL — LOW (ref 11.5–15.5)
MCHC RBC-ENTMCNC: 27.4 PG — SIGNIFICANT CHANGE UP (ref 27–34)
MCHC RBC-ENTMCNC: 31.3 GM/DL — LOW (ref 32–36)
MCV RBC AUTO: 87.5 FL — SIGNIFICANT CHANGE UP (ref 80–100)
PLATELET # BLD AUTO: 226 K/UL — SIGNIFICANT CHANGE UP (ref 150–400)
RBC # BLD: 4.09 M/UL — SIGNIFICANT CHANGE UP (ref 3.8–5.2)
RBC # FLD: 14.8 % — HIGH (ref 10.3–14.5)
WBC # BLD: 5.63 K/UL — SIGNIFICANT CHANGE UP (ref 3.8–10.5)
WBC # FLD AUTO: 5.63 K/UL — SIGNIFICANT CHANGE UP (ref 3.8–10.5)

## 2021-09-14 PROCEDURE — 99213 OFFICE O/P EST LOW 20 MIN: CPT | Mod: GE

## 2021-09-14 PROCEDURE — 85027 COMPLETE CBC AUTOMATED: CPT

## 2021-09-14 PROCEDURE — 83036 HEMOGLOBIN GLYCOSYLATED A1C: CPT

## 2021-09-14 PROCEDURE — G0463: CPT | Mod: 25

## 2021-09-14 PROCEDURE — 80053 COMPREHEN METABOLIC PANEL: CPT

## 2021-09-14 PROCEDURE — 87389 HIV-1 AG W/HIV-1&-2 AB AG IA: CPT

## 2021-09-14 PROCEDURE — 90688 IIV4 VACCINE SPLT 0.5 ML IM: CPT

## 2021-09-14 PROCEDURE — 80061 LIPID PANEL: CPT

## 2021-09-15 LAB
A1C WITH ESTIMATED AVERAGE GLUCOSE RESULT: 6 % — HIGH (ref 4–5.6)
ALBUMIN SERPL ELPH-MCNC: 4.1 G/DL — SIGNIFICANT CHANGE UP (ref 3.3–5)
ALP SERPL-CCNC: 91 U/L — SIGNIFICANT CHANGE UP (ref 40–120)
ALT FLD-CCNC: 11 U/L — SIGNIFICANT CHANGE UP (ref 10–45)
ANION GAP SERPL CALC-SCNC: 14 MMOL/L — SIGNIFICANT CHANGE UP (ref 5–17)
AST SERPL-CCNC: 14 U/L — SIGNIFICANT CHANGE UP (ref 10–40)
BILIRUB SERPL-MCNC: <0.2 MG/DL — SIGNIFICANT CHANGE UP (ref 0.2–1.2)
BUN SERPL-MCNC: 16 MG/DL — SIGNIFICANT CHANGE UP (ref 7–23)
CALCIUM SERPL-MCNC: 9.6 MG/DL — SIGNIFICANT CHANGE UP (ref 8.4–10.5)
CHLORIDE SERPL-SCNC: 106 MMOL/L — SIGNIFICANT CHANGE UP (ref 96–108)
CHOLEST SERPL-MCNC: 191 MG/DL — SIGNIFICANT CHANGE UP
CO2 SERPL-SCNC: 24 MMOL/L — SIGNIFICANT CHANGE UP (ref 22–31)
CREAT SERPL-MCNC: 1.15 MG/DL — SIGNIFICANT CHANGE UP (ref 0.5–1.3)
ESTIMATED AVERAGE GLUCOSE: 126 MG/DL — HIGH (ref 68–114)
GLUCOSE SERPL-MCNC: 92 MG/DL — SIGNIFICANT CHANGE UP (ref 70–99)
HDLC SERPL-MCNC: 66 MG/DL — SIGNIFICANT CHANGE UP
HIV 1+2 AB+HIV1 P24 AG SERPL QL IA: SIGNIFICANT CHANGE UP
LIPID PNL WITH DIRECT LDL SERPL: 109 MG/DL — HIGH
NON HDL CHOLESTEROL: 125 MG/DL — SIGNIFICANT CHANGE UP
POTASSIUM SERPL-MCNC: 3.4 MMOL/L — LOW (ref 3.5–5.3)
POTASSIUM SERPL-SCNC: 3.4 MMOL/L — LOW (ref 3.5–5.3)
PROT SERPL-MCNC: 7.6 G/DL — SIGNIFICANT CHANGE UP (ref 6–8.3)
SODIUM SERPL-SCNC: 143 MMOL/L — SIGNIFICANT CHANGE UP (ref 135–145)
TRIGL SERPL-MCNC: 77 MG/DL — SIGNIFICANT CHANGE UP

## 2021-09-15 NOTE — HISTORY OF PRESENT ILLNESS
[de-identified] : 46F w/ prediabetes, seizure disorder (Keppra & Zonegram), HTN (traimterene-HCTZ, enalipril, amlodipine), morbid obesity here for annual CPE. \par \par Patient arrived 35 minutes late to appointment and thus I will have to cut this appointment short.  \par \par Overall patient feels well. States that she occasionally has b/l nipple sensitivity on and off, happening about 1x per month lasting for a few hours. Upon further questioning she states that she has had irregular periods for 3 years, LMP 07/2021. She Saw OBGYN and was given medication to help prevent menopause but was unable to take it due to being hospitalized with COVID in 03/2020 c/b intubation. She states she has a follow up with OBGYN soon.\par \par Otherwise denies fever, chills, chest pain, SOB, N/V/D.\par \par #HTN \par --/80 today \par --Patient is taking these medications: traimterene-HCTZ, enalipril, amlodipine \par --Patient does not have any trouble taking or getting these medications. \par \par #Obesity\par --Weight 240 lbs today increased from 225 at the last visit) \par --interested in Weight management clinic \par \par #HCM \par --CBC, CMP, Lipid panel, A1c, Vitamin 25-OH D \par --COVID vaccine- Pfizer 3/24/21, 4/17/21 \par --Influenza Vaccine- will do today  \par --Cervical Cancer screening: will do with OBGYN referral  \par --Breast Ca screening:: will give referral  [FreeTextEntry1] : CPE

## 2021-09-15 NOTE — ASSESSMENT
[FreeTextEntry1] : 46F w/ prediabetes, seizure disorder (Keppra & Zonegram), HTN (traimterene-HCTZ, enalipril, amlodipine), morbid obesity here for annual CPE. \par \par #HTN \par --/80 today \par --Patient is taking these medications: traimterene-HCTZ, enalipril, amlodipine \par --Patient does not have any trouble taking or getting these medications. \par --Counseled patient on diet/lifestyle modifications \par \par #Obesity \par --Weight 240 lbs today increased from 225 at the last visit) \par --interested in Weight management clinic, referral given\par --Counseled patient on diet/lifestyle modifications \par \par #HCM \par --CBC, CMP, Lipid panel, A1c, Vitamin 25-OH D \par --COVID vaccine- Pfizer 3/24/21, 4/17/21 \par --Influenza Vaccine- will do today  \par --Cervical Cancer screening: will do with OBGYN referral  \par --Breast Ca screening:: will give referral \par \par RTC in 12 months for CPE\par \par Case d/w Dr. Hill

## 2021-09-15 NOTE — HEALTH RISK ASSESSMENT
[Good] : ~his/her~  mood as  good [No] : In the past 12 months have you used drugs other than those required for medical reasons? No [0] : 2) Feeling down, depressed, or hopeless: Not at all (0) [PHQ-2 Negative - No further assessment needed] : PHQ-2 Negative - No further assessment needed [] : No [JMD8Qples] : 0

## 2021-09-15 NOTE — COUNSELING
[Potential consequences of obesity discussed] : Potential consequences of obesity discussed [Benefits of weight loss discussed] : Benefits of weight loss discussed [Encouraged to maintain food diary] : Encouraged to maintain food diary [Encouraged to increase physical activity] : Encouraged to increase physical activity [Good understanding] : Patient has a good understanding of lifestyle changes and steps needed to achieve self management goal

## 2021-09-22 DIAGNOSIS — E66.9 OBESITY, UNSPECIFIED: ICD-10-CM

## 2021-09-22 DIAGNOSIS — Z23 ENCOUNTER FOR IMMUNIZATION: ICD-10-CM

## 2021-09-28 ENCOUNTER — APPOINTMENT (OUTPATIENT)
Dept: DERMATOLOGY | Facility: CLINIC | Age: 46
End: 2021-09-28
Payer: MEDICAID

## 2021-09-28 PROCEDURE — 99213 OFFICE O/P EST LOW 20 MIN: CPT | Mod: 25

## 2021-09-28 PROCEDURE — 11900 INJECT SKIN LESIONS </W 7: CPT

## 2021-11-08 ENCOUNTER — APPOINTMENT (OUTPATIENT)
Dept: ULTRASOUND IMAGING | Facility: IMAGING CENTER | Age: 46
End: 2021-11-08

## 2021-11-08 ENCOUNTER — OUTPATIENT (OUTPATIENT)
Dept: OUTPATIENT SERVICES | Facility: HOSPITAL | Age: 46
LOS: 1 days | End: 2021-11-08

## 2021-11-08 ENCOUNTER — APPOINTMENT (OUTPATIENT)
Dept: MAMMOGRAPHY | Facility: IMAGING CENTER | Age: 46
End: 2021-11-08

## 2021-11-08 DIAGNOSIS — Z00.8 ENCOUNTER FOR OTHER GENERAL EXAMINATION: ICD-10-CM

## 2021-11-10 ENCOUNTER — RX RENEWAL (OUTPATIENT)
Age: 46
End: 2021-11-10

## 2021-12-06 ENCOUNTER — RESULT REVIEW (OUTPATIENT)
Age: 46
End: 2021-12-06

## 2021-12-23 ENCOUNTER — RESULT REVIEW (OUTPATIENT)
Age: 46
End: 2021-12-23

## 2022-01-13 ENCOUNTER — RX RENEWAL (OUTPATIENT)
Age: 47
End: 2022-01-13

## 2022-01-13 ENCOUNTER — RESULT REVIEW (OUTPATIENT)
Age: 47
End: 2022-01-13

## 2022-01-21 ENCOUNTER — RESULT REVIEW (OUTPATIENT)
Age: 47
End: 2022-01-21

## 2022-03-10 ENCOUNTER — APPOINTMENT (OUTPATIENT)
Dept: DERMATOLOGY | Facility: CLINIC | Age: 47
End: 2022-03-10

## 2022-04-11 PROBLEM — Z11.59 SCREENING FOR VIRAL DISEASE: Status: ACTIVE | Noted: 2020-12-21

## 2022-07-19 PROBLEM — H40.003 GLAUCOMA SUSPECT OF BOTH EYES: Status: ACTIVE | Noted: 2017-10-17

## 2022-07-26 ENCOUNTER — RX RENEWAL (OUTPATIENT)
Age: 47
End: 2022-07-26

## 2022-08-04 ENCOUNTER — APPOINTMENT (OUTPATIENT)
Dept: DERMATOLOGY | Facility: CLINIC | Age: 47
End: 2022-08-04

## 2022-08-04 DIAGNOSIS — L81.8 OTHER SPECIFIED DISORDERS OF PIGMENTATION: ICD-10-CM

## 2022-08-04 DIAGNOSIS — L68.0 HIRSUTISM: ICD-10-CM

## 2022-08-04 DIAGNOSIS — L73.1 PSEUDOFOLLICULITIS BARBAE: ICD-10-CM

## 2022-08-04 PROCEDURE — 99214 OFFICE O/P EST MOD 30 MIN: CPT

## 2022-08-04 NOTE — ASSESSMENT
[FreeTextEntry1] : 1. Pseudofolliculitis barbae\par 2. PIH\par -Education and anticipatory guidance about condition was provided.\par - START tretinoin 0.1% cream pea sized amount to face QOD at night, increase to QHS as tolerated, SED. Wait until skin is completely dry before applying. May cause dryness, irritation, therefore should be followed by a gentle moisturizer. If any concerns about being pregnant, stop immediately as retinoids are associated with birth defects\par \par 3. hirsutism\par - LHR vs electrolysis reviewed, given hair color and skin type would likely require many (>10) treatments of LHR whereas she might require fewer of electrolysis\par -patient chooses to defer tx at this time.

## 2022-08-04 NOTE — PHYSICAL EXAM
[FreeTextEntry3] : + terminal hairs and perifollicular papules on chin and jawline\par ill defined brown macules

## 2022-08-04 NOTE — HISTORY OF PRESENT ILLNESS
[FreeTextEntry1] : ailin [de-identified] : REYES MCMAHON is a 47 year old female who presents for:\par \par 1) facial hair, bumps on chin x years. Says she was prescribed a topical used BID in the past. Tried waxing in many years ago when she was much younger (teenager) and currently shaves with razor

## 2022-08-05 RX ORDER — TRETINOIN 1 MG/G
0.1 CREAM TOPICAL
Qty: 1 | Refills: 5 | Status: ACTIVE | COMMUNITY
Start: 2022-08-04

## 2022-08-24 ENCOUNTER — RX RENEWAL (OUTPATIENT)
Age: 47
End: 2022-08-24

## 2022-08-25 ENCOUNTER — APPOINTMENT (OUTPATIENT)
Dept: INTERNAL MEDICINE | Facility: CLINIC | Age: 47
End: 2022-08-25

## 2022-08-25 ENCOUNTER — OUTPATIENT (OUTPATIENT)
Dept: OUTPATIENT SERVICES | Facility: HOSPITAL | Age: 47
LOS: 1 days | End: 2022-08-25
Payer: MEDICAID

## 2022-08-25 VITALS
BODY MASS INDEX: 43.43 KG/M2 | WEIGHT: 236 LBS | DIASTOLIC BLOOD PRESSURE: 90 MMHG | HEART RATE: 71 BPM | SYSTOLIC BLOOD PRESSURE: 122 MMHG | HEIGHT: 62 IN | OXYGEN SATURATION: 99 %

## 2022-08-25 DIAGNOSIS — I10 ESSENTIAL (PRIMARY) HYPERTENSION: ICD-10-CM

## 2022-08-25 PROCEDURE — 99213 OFFICE O/P EST LOW 20 MIN: CPT

## 2022-08-25 RX ORDER — MEDROXYPROGESTERONE ACETATE 10 MG/1
10 TABLET ORAL DAILY
Qty: 10 | Refills: 0 | Status: DISCONTINUED | COMMUNITY
Start: 2020-03-03 | End: 2022-08-25

## 2022-08-26 DIAGNOSIS — D69.6 THROMBOCYTOPENIA, UNSPECIFIED: ICD-10-CM

## 2022-08-26 LAB
ALBUMIN SERPL ELPH-MCNC: 4.2 G/DL
ALP BLD-CCNC: 86 U/L
ALT SERPL-CCNC: 12 U/L
ANION GAP SERPL CALC-SCNC: 15 MMOL/L
AST SERPL-CCNC: 14 U/L
BASOPHILS # BLD AUTO: 0.02 K/UL
BASOPHILS NFR BLD AUTO: 0.5 %
BILIRUB SERPL-MCNC: 0.2 MG/DL
BUN SERPL-MCNC: 14 MG/DL
CALCIUM SERPL-MCNC: 10.1 MG/DL
CHLORIDE SERPL-SCNC: 105 MMOL/L
CHOLEST SERPL-MCNC: 190 MG/DL
CO2 SERPL-SCNC: 23 MMOL/L
CREAT SERPL-MCNC: 1.06 MG/DL
EGFR: 66 ML/MIN/1.73M2
EOSINOPHIL # BLD AUTO: 0.04 K/UL
EOSINOPHIL NFR BLD AUTO: 0.9 %
ESTIMATED AVERAGE GLUCOSE: 120 MG/DL
GLUCOSE SERPL-MCNC: 83 MG/DL
HBA1C MFR BLD HPLC: 5.8 %
HCT VFR BLD CALC: 38.6 %
HDLC SERPL-MCNC: 62 MG/DL
HGB BLD-MCNC: 11.8 G/DL
IMM GRANULOCYTES NFR BLD AUTO: 0.2 %
LDLC SERPL CALC-MCNC: 115 MG/DL
LYMPHOCYTES # BLD AUTO: 1.47 K/UL
LYMPHOCYTES NFR BLD AUTO: 33.4 %
MAN DIFF?: NORMAL
MCHC RBC-ENTMCNC: 27.6 PG
MCHC RBC-ENTMCNC: 30.6 GM/DL
MCV RBC AUTO: 90.2 FL
MONOCYTES # BLD AUTO: 0.49 K/UL
MONOCYTES NFR BLD AUTO: 11.1 %
NEUTROPHILS # BLD AUTO: 2.37 K/UL
NEUTROPHILS NFR BLD AUTO: 53.9 %
NONHDLC SERPL-MCNC: 128 MG/DL
PLATELET # BLD AUTO: 100 K/UL
POTASSIUM SERPL-SCNC: 3.6 MMOL/L
PROT SERPL-MCNC: 7.7 G/DL
RBC # BLD: 4.28 M/UL
RBC # FLD: 13.9 %
SODIUM SERPL-SCNC: 143 MMOL/L
TRIGL SERPL-MCNC: 64 MG/DL
TSH SERPL-ACNC: 1.39 UIU/ML
WBC # FLD AUTO: 4.4 K/UL

## 2022-08-26 NOTE — HISTORY OF PRESENT ILLNESS
[FreeTextEntry1] : FU [de-identified] : Ms. Pineda is a 47 year old female, PMH pre-DM, seizure disorder, HTN, obesity presenting for FU. \par \par She is also concerned about her thyroid levels because had family history of "thyroid disorder" and because she has noticed scratchy throat and voice changes while singing. Since she had covid in 2020, has noticed her throat is dryer, especially when she sings. She saw ENT for this last year, underwent laryngoscopy, was prescribed omeprazole, takes it once/ week as needed. \par Also noticed an area of hair loss on her head. Feels fatigued but only sleeping 5 hours. Does not exercise. No constipation.

## 2022-08-26 NOTE — REVIEW OF SYSTEMS
[Hair Changes] : hair changes [Negative] : Heme/Lymph [Fatigue] : no fatigue [Itching] : no itching [Nail Changes] : no nail changes [Skin Rash] : no skin rash

## 2022-08-26 NOTE — ASSESSMENT
[FreeTextEntry1] : Ms. Pineda is a 47 year old female, PMH pre-DM, seizure disorder, HTN, obesity presenting for FU. \par \par #Throat dryness\par -trial of PPI (has not been taking it properly/ daily before eating) \par -ENT referral, counseled to return if dry sensation does not resolve with PPI. \par \par #HCM\par -gastroenterology referral for colonoscopy, as well as FIT test, provided\par -mammogram referral \par -CBC, CMP, lipids, A1C, TSH

## 2022-08-29 PROCEDURE — 85045 AUTOMATED RETICULOCYTE COUNT: CPT

## 2022-08-29 PROCEDURE — 83010 ASSAY OF HAPTOGLOBIN QUANT: CPT

## 2022-08-29 PROCEDURE — 83036 HEMOGLOBIN GLYCOSYLATED A1C: CPT

## 2022-08-29 PROCEDURE — 85025 COMPLETE CBC W/AUTO DIFF WBC: CPT

## 2022-08-29 PROCEDURE — 80053 COMPREHEN METABOLIC PANEL: CPT

## 2022-08-29 PROCEDURE — 84443 ASSAY THYROID STIM HORMONE: CPT

## 2022-08-29 PROCEDURE — 85049 AUTOMATED PLATELET COUNT: CPT

## 2022-08-29 PROCEDURE — 80061 LIPID PANEL: CPT

## 2022-08-29 PROCEDURE — G0463: CPT

## 2022-08-29 PROCEDURE — 83615 LACTATE (LD) (LDH) ENZYME: CPT

## 2022-08-30 LAB
HAPTOGLOB SERPL-MCNC: 153 MG/DL
LDH SERPL-CCNC: 259 U/L
PLATELET # PLAS AUTO: 234 K/UL
RBC # BLD: 4.28 M/UL
RETICS # AUTO: 1.6 %
RETICS AGGREG/RBC NFR: 68.5 K/UL

## 2022-09-29 ENCOUNTER — RX RENEWAL (OUTPATIENT)
Age: 47
End: 2022-09-29

## 2022-12-22 ENCOUNTER — APPOINTMENT (OUTPATIENT)
Dept: OBGYN | Facility: CLINIC | Age: 47
End: 2022-12-22

## 2023-01-06 ENCOUNTER — APPOINTMENT (OUTPATIENT)
Dept: DERMATOLOGY | Facility: CLINIC | Age: 48
End: 2023-01-06
Payer: MEDICAID

## 2023-01-06 DIAGNOSIS — D22.9 MELANOCYTIC NEVI, UNSPECIFIED: ICD-10-CM

## 2023-01-06 DIAGNOSIS — L21.9 SEBORRHEIC DERMATITIS, UNSPECIFIED: ICD-10-CM

## 2023-01-06 DIAGNOSIS — L65.9 NONSCARRING HAIR LOSS, UNSPECIFIED: ICD-10-CM

## 2023-01-06 PROCEDURE — 99214 OFFICE O/P EST MOD 30 MIN: CPT

## 2023-03-13 ENCOUNTER — RX RENEWAL (OUTPATIENT)
Age: 48
End: 2023-03-13

## 2023-04-11 ENCOUNTER — APPOINTMENT (OUTPATIENT)
Dept: INTERNAL MEDICINE | Facility: CLINIC | Age: 48
End: 2023-04-11
Payer: MEDICAID

## 2023-04-11 ENCOUNTER — OUTPATIENT (OUTPATIENT)
Dept: OUTPATIENT SERVICES | Facility: HOSPITAL | Age: 48
LOS: 1 days | End: 2023-04-11
Payer: MEDICAID

## 2023-04-11 VITALS
OXYGEN SATURATION: 99 % | HEART RATE: 70 BPM | HEIGHT: 62 IN | SYSTOLIC BLOOD PRESSURE: 136 MMHG | BODY MASS INDEX: 42.51 KG/M2 | DIASTOLIC BLOOD PRESSURE: 82 MMHG | WEIGHT: 231 LBS

## 2023-04-11 DIAGNOSIS — R56.9 UNSPECIFIED CONVULSIONS: ICD-10-CM

## 2023-04-11 DIAGNOSIS — Z23 ENCOUNTER FOR IMMUNIZATION: ICD-10-CM

## 2023-04-11 DIAGNOSIS — I10 ESSENTIAL (PRIMARY) HYPERTENSION: ICD-10-CM

## 2023-04-11 PROCEDURE — 84443 ASSAY THYROID STIM HORMONE: CPT

## 2023-04-11 PROCEDURE — 82607 VITAMIN B-12: CPT

## 2023-04-11 PROCEDURE — 80061 LIPID PANEL: CPT

## 2023-04-11 PROCEDURE — 80053 COMPREHEN METABOLIC PANEL: CPT

## 2023-04-11 PROCEDURE — 85025 COMPLETE CBC W/AUTO DIFF WBC: CPT

## 2023-04-11 PROCEDURE — 83036 HEMOGLOBIN GLYCOSYLATED A1C: CPT

## 2023-04-11 PROCEDURE — 99396 PREV VISIT EST AGE 40-64: CPT | Mod: GE

## 2023-04-11 PROCEDURE — G0463: CPT

## 2023-04-11 RX ORDER — METRONIDAZOLE 7.5 MG/G
0.75 GEL VAGINAL TWICE DAILY
Qty: 1 | Refills: 0 | Status: COMPLETED | COMMUNITY
Start: 2021-01-14 | End: 2023-04-11

## 2023-04-11 RX ORDER — FLUOCINOLONE ACETONIDE 0.11 MG/ML
0.01 OIL TOPICAL
Qty: 1 | Refills: 6 | Status: ACTIVE | COMMUNITY
Start: 2023-01-06

## 2023-04-11 RX ORDER — OMEPRAZOLE 40 MG/1
40 CAPSULE, DELAYED RELEASE ORAL
Qty: 30 | Refills: 1 | Status: COMPLETED | COMMUNITY
Start: 2021-09-01 | End: 2023-04-11

## 2023-04-12 LAB
ALBUMIN SERPL ELPH-MCNC: 4.1 G/DL
ALP BLD-CCNC: 93 U/L
ALT SERPL-CCNC: 11 U/L
ANION GAP SERPL CALC-SCNC: 14 MMOL/L
AST SERPL-CCNC: 12 U/L
BASOPHILS # BLD AUTO: 0.02 K/UL
BASOPHILS NFR BLD AUTO: 0.4 %
BILIRUB SERPL-MCNC: 0.2 MG/DL
BUN SERPL-MCNC: 14 MG/DL
CALCIUM SERPL-MCNC: 9.9 MG/DL
CHLORIDE SERPL-SCNC: 104 MMOL/L
CHOLEST SERPL-MCNC: 179 MG/DL
CO2 SERPL-SCNC: 25 MMOL/L
CREAT SERPL-MCNC: 1.12 MG/DL
EGFR: 61 ML/MIN/1.73M2
EOSINOPHIL # BLD AUTO: 0.03 K/UL
EOSINOPHIL NFR BLD AUTO: 0.6 %
ESTIMATED AVERAGE GLUCOSE: 120 MG/DL
FOLATE SERPL-MCNC: 9.4 NG/ML
GLUCOSE SERPL-MCNC: 90 MG/DL
HBA1C MFR BLD HPLC: 5.8 %
HCT VFR BLD CALC: 40.8 %
HDLC SERPL-MCNC: 66 MG/DL
HGB BLD-MCNC: 12.1 G/DL
IMM GRANULOCYTES NFR BLD AUTO: 0 %
LDLC SERPL CALC-MCNC: 98 MG/DL
LYMPHOCYTES # BLD AUTO: 1.64 K/UL
LYMPHOCYTES NFR BLD AUTO: 34.5 %
MAN DIFF?: NORMAL
MCHC RBC-ENTMCNC: 27.4 PG
MCHC RBC-ENTMCNC: 29.7 GM/DL
MCV RBC AUTO: 92.3 FL
MONOCYTES # BLD AUTO: 0.49 K/UL
MONOCYTES NFR BLD AUTO: 10.3 %
NEUTROPHILS # BLD AUTO: 2.58 K/UL
NEUTROPHILS NFR BLD AUTO: 54.2 %
NONHDLC SERPL-MCNC: 113 MG/DL
PLATELET # BLD AUTO: 158 K/UL
POTASSIUM SERPL-SCNC: 3.1 MMOL/L
PROT SERPL-MCNC: 7.8 G/DL
RBC # BLD: 4.42 M/UL
RBC # FLD: 13.9 %
SODIUM SERPL-SCNC: 143 MMOL/L
TRIGL SERPL-MCNC: 77 MG/DL
TSH SERPL-ACNC: 1.87 UIU/ML
VIT B12 SERPL-MCNC: 817 PG/ML
WBC # FLD AUTO: 4.76 K/UL

## 2023-04-17 DIAGNOSIS — Z12.11 ENCOUNTER FOR SCREENING FOR MALIGNANT NEOPLASM OF COLON: ICD-10-CM

## 2023-04-17 DIAGNOSIS — R56.9 UNSPECIFIED CONVULSIONS: ICD-10-CM

## 2023-04-17 DIAGNOSIS — Z00.00 ENCOUNTER FOR GENERAL ADULT MEDICAL EXAMINATION WITHOUT ABNORMAL FINDINGS: ICD-10-CM

## 2023-04-17 DIAGNOSIS — E66.9 OBESITY, UNSPECIFIED: ICD-10-CM

## 2023-04-17 NOTE — HISTORY OF PRESENT ILLNESS
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 \par \par Patient states she feels well. 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 She is single and lives with her mother. She has a recently new dx of sz 5 years ago triggered by stress, silent sz on ant-sz medications, managed by neurologist. The patient's last episode occurred 1 month ago as reported by her mother and observed the patient "zoning out" but able to hear the conversation, returning to baseline within 2-3 minutes. No interruption in daily life. The patient does report h/o of obesity and did have significant weight loss 265 to 220s, but no recent weight loss reported. She is interested in having a family at this time and will be setting up an appointment with her OBGYN as well. Otherwise, the patient has no new complaints. The patient has been lost to follow-up over the past 1-2 years. \par

## 2023-04-17 NOTE — ASSESSMENT
[FreeTextEntry1] : 48 year-old FM with h/o preDM, sz disorder (Keppra & Zonegram), HTN (traimterene-HCTZ, enalipril, amlodipine), h/o COVID 2020 (c/b intubation) and morbid obesity who presents for annual CPE, last CPE in 2021. \par \par - Patient lost to follow-up over the past few years, need follow-up with: Neuro and OBGYN \par - Patient due for TDAP, due today, however contraindication given h/o of sz, discussed with attending and deferred to neurology for clearance \par - Patient interested in conceiving, will schedule appt with OBGYN and discuss medication management (anti-sz medication) while attempting to conceive \par - Patient will focus on weight management with weight management referral and nutritionist

## 2023-04-17 NOTE — PLAN
[FreeTextEntry1] : 48 year-old FM with h/o preDM, sz disorder (Keppra & Zonegram), HTN (traimterene-HCTZ, enalipril, amlodipine), h/o COVID 2020 (c/b intubation) and morbid obesity who presents for annual CPE, last CPE in 2021. \par \par #HTN \par --/82 today \par --Patient is taking these medications: traimterene-HCTZ, enalipril, amlodipine \par --Patient does not have any trouble taking or getting these medications. \par \par #Obesity\par --Weight 231 lbs today, improved from 240 lbs in 2021 CPE\par --interested in Weight management clinic and nutritionist\par \par #HCM \par --CBC, CMP, Lipid panel, A1c, b12/folate\par --COVID vaccine- Pfizer 3/24/21, 4/17/21 \par --Influenza Vaccine- will wait for upcoming season\par -- TDAP due today, however may interact with sz h/o, deferred till neurologist evaluates \par --Cervical Cancer screening: will do with OBGYN referral \par --Breast Ca screening:: will give referral\par --Neuro Sz: will see the neurologist for follow-up\par --GI colonoscopy screening - referral sent \par -- Dental referral sent\par \par Case discussed with Dr. Hill\par RTC in 6 months \par \par SM

## 2023-04-17 NOTE — HEALTH RISK ASSESSMENT
[1 or 2 (0 pts)] : 1 or 2 (0 points) [Never (0 pts)] : Never (0 points) [No falls in past year] : Patient reported no falls in the past year [1] : 1) Little interest or pleasure doing things for several days (1) [0] : 2) Feeling down, depressed, or hopeless: Not at all (0) [PHQ-2 Negative - No further assessment needed] : PHQ-2 Negative - No further assessment needed [Patient reported mammogram was normal] : Patient reported mammogram was normal [HIV Test offered] : HIV Test offered [Hepatitis C test offered] : Hepatitis C test offered [None] : None [# of Members in Household ___] :  household currently consist of [unfilled] member(s) [Employed] : employed [Single] : single [Sexually Active] : sexually active [Feels Safe at Home] : Feels safe at home [Fully functional (bathing, dressing, toileting, transferring, walking, feeding)] : Fully functional (bathing, dressing, toileting, transferring, walking, feeding) [Fully functional (using the telephone, shopping, preparing meals, housekeeping, doing laundry, using] : Fully functional and needs no help or supervision to perform IADLs (using the telephone, shopping, preparing meals, housekeeping, doing laundry, using transportation, managing medications and managing finances) [Never] : Never [0-4] : 0-4 [Good] : ~his/her~  mood as  good [No] : No [Audit-CScore] : 0 [de-identified] : walking do the gym - walking on the treadmill  [de-identified] : increased water intake, healthy salad and proteins  [[id number]] : 1 [Reports changes in hearing] : Reports no changes in hearing [MammogramDate] : 01/2021 [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

## 2023-04-17 NOTE — PHYSICAL EXAM
[No Acute Distress] : no acute distress [Well Nourished] : well nourished [Well Developed] : well developed [Well-Appearing] : well-appearing [Normal] : no respiratory distress, lungs were clear to auscultation bilaterally and no accessory muscle use [Normal Rate] : normal rate  [Regular Rhythm] : with a regular rhythm [Normal S1, S2] : normal S1 and S2 [No Varicosities] : no varicosities [Pedal Pulses Present] : the pedal pulses are present [Soft] : abdomen soft [Non Tender] : non-tender [Non-distended] : non-distended [No CVA Tenderness] : no CVA  tenderness [No Joint Swelling] : no joint swelling [No Rash] : no rash [Normal Gait] : normal gait [Normal Affect] : the affect was normal [Normal Insight/Judgement] : insight and judgment were intact [de-identified] : obese [de-identified] : nonpitting edema mild

## 2023-06-23 NOTE — PHYSICAL EXAM
Oriented To Time, Place, And Person] : oriented to person, place, and time [Person] : oriented to person [Short Term Intact] : short term memory intact [Span Intact] : the attention span was normal [Naming Objects] : no difficulty naming common objects [Cranial Nerves Optic (II)] : visual acuity intact bilaterally,  visual fields full to confrontation, pupils equal round and reactive to light [Cranial Nerves Oculomotor (III)] : extraocular motion intact [Cranial Nerves Trigeminal (V)] : facial sensation intact symmetrically [Cranial Nerves Facial (VII)] : face symmetrical [Motor Handedness Right-Handed] : the patient is right hand dominant [Sensation Tactile Decrease] : light touch was intact [Balance] : balance was intact [2+] : Patella left 2+ [Sclera] : the sclera and conjunctiva were normal [PERRL With Normal Accommodation] : pupils were equal in size, round, reactive to light, with normal accommodation [Extraocular Movements] : extraocular movements were intact [Outer Ear] : the ears and nose were normal in appearance [] : no respiratory distress [Heart Sounds] : normal S1 and S2 [Abdomen Soft] : soft [Abdomen Tenderness] : non-tender [No CVA Tenderness] : no ~M costovertebral angle tenderness [Abnormal Walk] : normal gait [FreeTextEntry1] : obese, some leg edema no papilledema on exam  [Motor Strength Upper Extremities Bilaterally] : strength was normal in both upper extremities [Motor Strength Lower Extremities Bilaterally] : strength was normal in both lower extremities

## 2023-06-27 ENCOUNTER — LABORATORY RESULT (OUTPATIENT)
Age: 48
End: 2023-06-27

## 2023-06-28 ENCOUNTER — LABORATORY RESULT (OUTPATIENT)
Age: 48
End: 2023-06-28

## 2023-06-28 ENCOUNTER — APPOINTMENT (OUTPATIENT)
Dept: OBGYN | Facility: CLINIC | Age: 48
End: 2023-06-28
Payer: MEDICAID

## 2023-06-28 ENCOUNTER — RESULT REVIEW (OUTPATIENT)
Age: 48
End: 2023-06-28

## 2023-06-28 ENCOUNTER — OUTPATIENT (OUTPATIENT)
Dept: OUTPATIENT SERVICES | Facility: HOSPITAL | Age: 48
LOS: 1 days | End: 2023-06-28
Payer: MEDICAID

## 2023-06-28 VITALS
BODY MASS INDEX: 42.88 KG/M2 | SYSTOLIC BLOOD PRESSURE: 122 MMHG | WEIGHT: 233 LBS | HEIGHT: 62 IN | DIASTOLIC BLOOD PRESSURE: 80 MMHG

## 2023-06-28 DIAGNOSIS — N76.0 ACUTE VAGINITIS: ICD-10-CM

## 2023-06-28 DIAGNOSIS — N93.9 ABNORMAL UTERINE AND VAGINAL BLEEDING, UNSPECIFIED: ICD-10-CM

## 2023-06-28 PROCEDURE — 87491 CHLMYD TRACH DNA AMP PROBE: CPT

## 2023-06-28 PROCEDURE — 83002 ASSAY OF GONADOTROPIN (LH): CPT

## 2023-06-28 PROCEDURE — 87591 N.GONORRHOEAE DNA AMP PROB: CPT

## 2023-06-28 PROCEDURE — 88175 CYTOPATH C/V AUTO FLUID REDO: CPT

## 2023-06-28 PROCEDURE — 99396 PREV VISIT EST AGE 40-64: CPT | Mod: GE

## 2023-06-28 PROCEDURE — 87624 HPV HI-RISK TYP POOLED RSLT: CPT

## 2023-06-28 PROCEDURE — 83001 ASSAY OF GONADOTROPIN (FSH): CPT

## 2023-06-28 PROCEDURE — 87389 HIV-1 AG W/HIV-1&-2 AB AG IA: CPT

## 2023-06-28 PROCEDURE — 84443 ASSAY THYROID STIM HORMONE: CPT

## 2023-06-28 PROCEDURE — G0463: CPT

## 2023-06-28 PROCEDURE — 87340 HEPATITIS B SURFACE AG IA: CPT

## 2023-06-28 PROCEDURE — 36415 COLL VENOUS BLD VENIPUNCTURE: CPT

## 2023-06-28 PROCEDURE — 86780 TREPONEMA PALLIDUM: CPT

## 2023-06-29 LAB
C TRACH RRNA SPEC QL NAA+PROBE: SIGNIFICANT CHANGE UP
FSH SERPL-MCNC: 65.7 IU/L — SIGNIFICANT CHANGE UP
HBV SURFACE AG SER-ACNC: SIGNIFICANT CHANGE UP
HIV 1+2 AB+HIV1 P24 AG SERPL QL IA: SIGNIFICANT CHANGE UP
HPV HIGH+LOW RISK DNA PNL CVX: SIGNIFICANT CHANGE UP
LH SERPL-ACNC: 48.1 IU/L — SIGNIFICANT CHANGE UP
N GONORRHOEA RRNA SPEC QL NAA+PROBE: SIGNIFICANT CHANGE UP
SPECIMEN SOURCE: SIGNIFICANT CHANGE UP
T PALLIDUM AB TITR SER: NEGATIVE — SIGNIFICANT CHANGE UP
TSH SERPL-MCNC: 1.84 UIU/ML — SIGNIFICANT CHANGE UP (ref 0.27–4.2)

## 2023-06-30 NOTE — HISTORY OF PRESENT ILLNESS
[FreeTextEntry1] : 48 y.o.  LMP 2 months prior presenting to GYN for annual and concerns of irregular periods. She reports that over the past 5 years, she has had irregular menses where she will only have a period 3-4 times a year. They are light but last up to 9 days. She is concerned because she has a friend the same age as her who is still "very much so regular." She denies any hot flashes, vaginal discharge, dryness, fatigue. She reports that she does have interest in future fertility but does not have current partner and is not interested in IVF. \par \par OBHx: \par - eTOP x 3 w/ D&C x 3 \par GynHc: denies any fibroids, abnl pap smears STIs. \par PMHx: seizure d/o last 3 months prior- reports she "blanks out" during them. HTN, hx of COVID 2020 s/p intubation \par Meds: Keppra, Triamterene-HCTZ\par SHx: D&C \par Psych: denies anxiety or depression \par Social: denies alochol, tobacco, illicit substance use. 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 \par All: NKDA \par FMHx: father w/ prostate CA

## 2023-06-30 NOTE — PHYSICAL EXAM
[Appropriately responsive] : appropriately responsive [Alert] : alert [No Acute Distress] : no acute distress [Regular Rate Rhythm] : regular rate rhythm [No Murmurs] : no murmurs [Soft] : soft [Non-tender] : non-tender [Non-distended] : non-distended [No HSM] : No HSM [No Lesions] : no lesions [Oriented x3] : oriented x3 [Examination Of The Breasts] : a normal appearance [No Masses] : no breast masses were palpable [Labia Majora] : normal [Labia Minora] : normal [Normal] :  normal

## 2023-07-01 LAB — CYTOLOGY SPEC DOC CYTO: SIGNIFICANT CHANGE UP

## 2023-07-03 ENCOUNTER — APPOINTMENT (OUTPATIENT)
Dept: MAMMOGRAPHY | Facility: IMAGING CENTER | Age: 48
End: 2023-07-03
Payer: MEDICAID

## 2023-07-03 ENCOUNTER — RESULT REVIEW (OUTPATIENT)
Age: 48
End: 2023-07-03

## 2023-07-03 ENCOUNTER — OUTPATIENT (OUTPATIENT)
Dept: OUTPATIENT SERVICES | Facility: HOSPITAL | Age: 48
LOS: 1 days | End: 2023-07-03
Payer: MEDICAID

## 2023-07-03 DIAGNOSIS — Z00.00 ENCOUNTER FOR GENERAL ADULT MEDICAL EXAMINATION WITHOUT ABNORMAL FINDINGS: ICD-10-CM

## 2023-07-03 DIAGNOSIS — N93.9 ABNORMAL UTERINE AND VAGINAL BLEEDING, UNSPECIFIED: ICD-10-CM

## 2023-07-03 DIAGNOSIS — Z00.8 ENCOUNTER FOR OTHER GENERAL EXAMINATION: ICD-10-CM

## 2023-07-03 DIAGNOSIS — N63.0 UNSPECIFIED LUMP IN UNSPECIFIED BREAST: ICD-10-CM

## 2023-07-03 PROCEDURE — 77063 BREAST TOMOSYNTHESIS BI: CPT

## 2023-07-03 PROCEDURE — 77063 BREAST TOMOSYNTHESIS BI: CPT | Mod: 26

## 2023-07-03 PROCEDURE — 77067 SCR MAMMO BI INCL CAD: CPT

## 2023-07-03 PROCEDURE — 77067 SCR MAMMO BI INCL CAD: CPT | Mod: 26

## 2023-07-05 PROBLEM — N63.0 BREAST NODULE: Status: ACTIVE | Noted: 2023-07-05

## 2023-09-12 ENCOUNTER — RESULT REVIEW (OUTPATIENT)
Age: 48
End: 2023-09-12

## 2023-09-12 ENCOUNTER — APPOINTMENT (OUTPATIENT)
Dept: MAMMOGRAPHY | Facility: IMAGING CENTER | Age: 48
End: 2023-09-12
Payer: MEDICAID

## 2023-09-12 ENCOUNTER — OUTPATIENT (OUTPATIENT)
Dept: OUTPATIENT SERVICES | Facility: HOSPITAL | Age: 48
LOS: 1 days | End: 2023-09-12
Payer: MEDICAID

## 2023-09-12 ENCOUNTER — APPOINTMENT (OUTPATIENT)
Dept: ULTRASOUND IMAGING | Facility: IMAGING CENTER | Age: 48
End: 2023-09-12
Payer: MEDICAID

## 2023-09-12 DIAGNOSIS — Z00.8 ENCOUNTER FOR OTHER GENERAL EXAMINATION: ICD-10-CM

## 2023-09-12 PROCEDURE — G0279: CPT | Mod: 26

## 2023-09-12 PROCEDURE — 76642 ULTRASOUND BREAST LIMITED: CPT | Mod: 26,LT

## 2023-09-12 PROCEDURE — 76642 ULTRASOUND BREAST LIMITED: CPT

## 2023-09-12 PROCEDURE — 77065 DX MAMMO INCL CAD UNI: CPT | Mod: 26,LT

## 2023-09-12 PROCEDURE — 77065 DX MAMMO INCL CAD UNI: CPT

## 2023-09-12 PROCEDURE — G0279: CPT

## 2023-09-18 ENCOUNTER — APPOINTMENT (OUTPATIENT)
Dept: NEUROLOGY | Facility: CLINIC | Age: 48
End: 2023-09-18

## 2023-09-20 ENCOUNTER — APPOINTMENT (OUTPATIENT)
Dept: NEUROLOGY | Facility: CLINIC | Age: 48
End: 2023-09-20

## 2023-10-03 ENCOUNTER — APPOINTMENT (OUTPATIENT)
Dept: NEUROLOGY | Facility: CLINIC | Age: 48
End: 2023-10-03
Payer: MEDICAID

## 2023-10-03 ENCOUNTER — APPOINTMENT (OUTPATIENT)
Dept: NEUROLOGY | Facility: CLINIC | Age: 48
End: 2023-10-03

## 2023-10-03 PROCEDURE — 99202 OFFICE O/P NEW SF 15 MIN: CPT | Mod: 95

## 2023-10-20 ENCOUNTER — APPOINTMENT (OUTPATIENT)
Dept: DERMATOLOGY | Facility: CLINIC | Age: 48
End: 2023-10-20
Payer: MEDICAID

## 2023-10-20 DIAGNOSIS — L25.9 UNSPECIFIED CONTACT DERMATITIS, UNSPECIFIED CAUSE: ICD-10-CM

## 2023-10-20 PROCEDURE — 99213 OFFICE O/P EST LOW 20 MIN: CPT

## 2023-10-25 RX ORDER — ZONISAMIDE 100 MG/1
100 CAPSULE ORAL
Qty: 90 | Refills: 0 | Status: DISCONTINUED | COMMUNITY
Start: 2019-07-12 | End: 2023-10-25

## 2024-02-10 NOTE — HISTORY OF PRESENT ILLNESS
[FreeTextEntry1] : Concern for prior missed appts  **UPDATE:10/3/2024*** Appointment was conducted by audiovisual/telehealth at request of patient in place of a follow up office visit due to heightened concern for Corona virus infection risk. Verbal consent given on OCT 03, 2023 1:41 PM by the patient Ms. KYLEIGH PINEDA Jan 8 1975 who understands that the telephone visit will be charged to insurance and may involve co-pay for patient Nurse Practitioner location:office Patient location:home Individuals on call: HOWIE Vazquez, Ms. KYLEIGH Pineda was last seen Dec 2020 and has cancelled appointments She reports no interval GTCs infrequent dizziness  Levetiracetam 750 mg BID Zonisamide 400 mg daily   ***UPDATE:12/28/2020*** Ms Kyleigh Pineda is here today for a scheduled follow up office visit She reports 2 episodes of dizziness. She is unable to respond during the episodes September and last event was November (stress is typically her trigger)  Of note Ms Pineda was Covid positive in March of this year and was intubated for 11 days at [facility] ([facility])   Levetiracetam 750mg BID Zonisamide 400mg at bedtime  ***UPDATE:1/24/20*** Ms Kyleigh Pineda was 30 minutes late for appointment again today Describes some issues with short term memory She reports 2 brief staring episodes since last office viist (last event was 2 months ago) both triggered by stress kierra itchiness of scalp and hair loss  Zonisamide 400mg daily Levetiracetam 750mg BID  ***UPDATE:7/12/19*** 30 minutes late for appointment today* Meds:  LEV 750mg bid, inc in [facility] on d/c. she stopped Zonisamide in January due to headaches?  Events as of late:  No ADRs.  Still having events, milder  Feels eyes feel funny sometimes, blurriness intermittently.  Infrequent HA top of head.  No AM HA.  One staring episode in last 3mo with feeling overwhelmed.   HPI: 42 yo F who presents for seizures. "convulsions" which began in January 2017. The first episode of convulsions occurred. She had urinary incontinence and severe tongue biting with laceration.  At that time she was admitted to [facility] and a full seizure workup was reportedly completed including an MRI and was reportedly "negative." Sometimes she also has staring episodes with associated clicking of her tongue and mouth and looking at her right hand, after several minutes it resolves and the pt smiles. Most of her episodes occur while she is sleeping except the staring spells with tongue clicking and rubbing her fingers together b/l.   At [facility] neuro clinic: 1/28/18 MRI with subtle left temporal tip and posterior hippocampal FLAIR changes  NKDA ROS - numbness in toes and sometimes has difficulty finding words quickly over the past few months, also depressed mood. Denies problems talking, walking, weakness, word finding difficulties, dizziness, headache, lightheadness Social - denies toxic habits, nonsmoker

## 2024-02-10 NOTE — DATA REVIEWED
[de-identified] : 1/28/18 Addendum: MRI with subtle left temporal tip and posterior hippocampal FLAIR changes [de-identified] : VEEG (1/2018) 3 day: shows left frontotemporal waveforms with spikes and waves in sleep. 3 left frontotemporal onset seizures with right sided spread.

## 2024-02-10 NOTE — DISCUSSION/SUMMARY
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  First episode was in January of 2017, recent onset.  GTCs but also staring spells with tongue clicking and b/l finger rubbing automatisms.   Plan: Continue Keppra 750mg BID, Zonisamide 400mg daily - encouraged adequate fluids on ZNS to help avoid nephrolithiasis - reviewed seizure triggers i.e stress - annual labwork - follow up in 4 months No driving until seizure free for 1 year, safety

## 2024-02-10 NOTE — PHYSICAL EXAM
[FreeTextEntry1] : obese, some leg edema no papilledema on exam  [Motor Strength Upper Extremities Bilaterally] : strength was normal in both upper extremities [Motor Strength Lower Extremities Bilaterally] : strength was normal in both lower extremities

## 2024-04-15 ENCOUNTER — APPOINTMENT (OUTPATIENT)
Dept: INTERNAL MEDICINE | Facility: CLINIC | Age: 49
End: 2024-04-15

## 2024-04-30 ENCOUNTER — APPOINTMENT (OUTPATIENT)
Dept: DERMATOLOGY | Facility: CLINIC | Age: 49
End: 2024-04-30
Payer: MEDICAID

## 2024-04-30 DIAGNOSIS — L81.0 POSTINFLAMMATORY HYPERPIGMENTATION: ICD-10-CM

## 2024-04-30 DIAGNOSIS — L25.8 UNSPECIFIED CONTACT DERMATITIS DUE TO OTHER AGENTS: ICD-10-CM

## 2024-04-30 DIAGNOSIS — Z91.09 OTHER ALLERGY STATUS, OTHER THAN TO DRUGS AND BIOLOGICAL SUBSTANCES: ICD-10-CM

## 2024-04-30 PROCEDURE — 99214 OFFICE O/P EST MOD 30 MIN: CPT

## 2024-04-30 RX ORDER — HYDROQUINONE 40 MG/G
4 CREAM TOPICAL
Qty: 2 | Refills: 2 | Status: ACTIVE | COMMUNITY
Start: 2024-04-30 | End: 1900-01-01

## 2024-05-08 RX ORDER — LEVETIRACETAM 750 MG/1
750 TABLET, FILM COATED ORAL
Qty: 180 | Refills: 0 | Status: ACTIVE | COMMUNITY
Start: 2018-04-29 | End: 1900-01-01

## 2024-05-13 NOTE — HISTORY OF PRESENT ILLNESS
[FreeTextEntry1] : fu itchy skin [de-identified] : REYES MCMAHON is a 49 year old F here for evaluation of below  # Itchy skin/redness on the neck - flared up over the weekend  - trying some new perfumes too

## 2024-05-13 NOTE — ASSESSMENT
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

## 2024-05-13 NOTE — PHYSICAL EXAM
[Alert] : alert [Oriented x 3] : ~L oriented x 3 [Well Nourished] : well nourished [Conjunctiva Non-injected] : conjunctiva non-injected [No Visual Lymphadenopathy] : no visual  lymphadenopathy [No Clubbing] : no clubbing [No Edema] : no edema [No Bromhidrosis] : no bromhidrosis [No Chromhidrosis] : no chromhidrosis [FreeTextEntry3] : hyperpigmented patches on the lateral neck in a necklace distribution

## 2024-05-14 ENCOUNTER — RX RENEWAL (OUTPATIENT)
Age: 49
End: 2024-05-14

## 2024-05-14 RX ORDER — ZONISAMIDE 100 MG/1
100 CAPSULE ORAL
Qty: 120 | Refills: 5 | Status: ACTIVE | COMMUNITY
Start: 2023-10-25 | End: 1900-01-01

## 2024-06-07 NOTE — HISTORY OF PRESENT ILLNESS
[FreeTextEntry1] : CPE [de-identified] : 49 year-old FM with h/o preDM, sz disorder (Keppra & Zonegram), HTN (traimterene-HCTZ, enalipril, amlodipine), h/o COVID 2020 (c/b intubation) and morbid obesity who presents for annual CPE. ----- Patient states she feels well. 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 She is single and lives with her mother. She has a recently new dx of sz 5 years ago triggered by stress, silent sz on ant-sz medications, managed by neurologist. The patient's last episode occurred 1 month ago as reported by her mother and observed the patient "zoning out" but able to hear the conversation, returning to baseline within 2-3 minutes. No interruption in daily life. The patient does report h/o of obesity and did have significant weight loss 265 to 220s, but no recent weight loss reported. She is interested in having a family at this time and will be setting up an appointment with her OBGYN as well. Otherwise, the patient has no new complaints. The patient has been lost to follow-up over the past 1-2 years.

## 2024-06-07 NOTE — HEALTH RISK ASSESSMENT
[Good] : ~his/her~  mood as  good [No] : No [1 or 2 (0 pts)] : 1 or 2 (0 points) [Never (0 pts)] : Never (0 points) [No falls in past year] : Patient reported no falls in the past year [1] : 1) Little interest or pleasure doing things for several days (1) [0] : 2) Feeling down, depressed, or hopeless: Not at all (0) [PHQ-2 Negative - No further assessment needed] : PHQ-2 Negative - No further assessment needed [Audit-CScore] : 0 [de-identified] : walking do the gym - walking on the treadmill  [de-identified] : increased water intake, healthy salad and proteins  [[id number]] : 1 [Never] : Never [0-4] : 0-4 [Patient reported mammogram was normal] : Patient reported mammogram was normal [HIV Test offered] : HIV Test offered [Hepatitis C test offered] : Hepatitis C test offered [None] : None [# of Members in Household ___] :  household currently consist of [unfilled] member(s) [Employed] : employed [Single] : single [Sexually Active] : sexually active [Feels Safe at Home] : Feels safe at home [Fully functional (bathing, dressing, toileting, transferring, walking, feeding)] : Fully functional (bathing, dressing, toileting, transferring, walking, feeding) [Fully functional (using the telephone, shopping, preparing meals, housekeeping, doing laundry, using] : Fully functional and needs no help or supervision to perform IADLs (using the telephone, shopping, preparing meals, housekeeping, doing laundry, using transportation, managing medications and managing finances) [Reports changes in hearing] : Reports no changes in hearing [MammogramDate] : 01/2021 [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

## 2024-06-07 NOTE — PHYSICAL EXAM
[No Acute Distress] : no acute distress [Well Nourished] : well nourished [Well Developed] : well developed [Well-Appearing] : well-appearing [Normal] : no respiratory distress, lungs were clear to auscultation bilaterally and no accessory muscle use [Normal Rate] : normal rate  [Regular Rhythm] : with a regular rhythm [Normal S1, S2] : normal S1 and S2 [No Varicosities] : no varicosities [Pedal Pulses Present] : the pedal pulses are present [Soft] : abdomen soft [Non Tender] : non-tender [Non-distended] : non-distended [No CVA Tenderness] : no CVA  tenderness [No Joint Swelling] : no joint swelling [No Rash] : no rash [Normal Gait] : normal gait [Normal Affect] : the affect was normal [Normal Insight/Judgement] : insight and judgment were intact [de-identified] : obese [de-identified] : nonpitting edema mild

## 2024-06-14 ENCOUNTER — OUTPATIENT (OUTPATIENT)
Dept: OUTPATIENT SERVICES | Facility: HOSPITAL | Age: 49
LOS: 1 days | End: 2024-06-14
Payer: MEDICAID

## 2024-06-14 ENCOUNTER — APPOINTMENT (OUTPATIENT)
Dept: INTERNAL MEDICINE | Facility: CLINIC | Age: 49
End: 2024-06-14
Payer: COMMERCIAL

## 2024-06-14 VITALS
HEART RATE: 70 BPM | BODY MASS INDEX: 42.33 KG/M2 | SYSTOLIC BLOOD PRESSURE: 122 MMHG | WEIGHT: 230 LBS | DIASTOLIC BLOOD PRESSURE: 90 MMHG | HEIGHT: 62 IN | OXYGEN SATURATION: 96 %

## 2024-06-14 DIAGNOSIS — D64.9 ANEMIA, UNSPECIFIED: ICD-10-CM

## 2024-06-14 DIAGNOSIS — G40.219 LOCALIZATION-RELATED (FOCAL) (PARTIAL) SYMPTOMATIC EPILEPSY AND EPILEPTIC SYNDROMES WITH COMPLEX PARTIAL SEIZURES, INTRACTABLE, WITHOUT STATUS EPILEPTICUS: ICD-10-CM

## 2024-06-14 DIAGNOSIS — Z13.39 ENCOUNTER FOR SCREENING EXAMINATION FOR OTHER MENTAL HEALTH AND BEHAVIORAL DISORDERS: ICD-10-CM

## 2024-06-14 DIAGNOSIS — E66.9 OBESITY, UNSPECIFIED: ICD-10-CM

## 2024-06-14 DIAGNOSIS — Z13.39 ENCOUNTER FOR SCREENING EXAM FOR OTHER MENTAL HEALTH AND BEHAVIORAL DISORDERS: ICD-10-CM

## 2024-06-14 DIAGNOSIS — I10 ESSENTIAL (PRIMARY) HYPERTENSION: ICD-10-CM

## 2024-06-14 DIAGNOSIS — Z00.00 ENCOUNTER FOR GENERAL ADULT MEDICAL EXAMINATION W/OUT ABNORMAL FINDINGS: ICD-10-CM

## 2024-06-14 DIAGNOSIS — R49.0 DYSPHONIA: ICD-10-CM

## 2024-06-14 DIAGNOSIS — Z00.00 ENCOUNTER FOR GENERAL ADULT MEDICAL EXAMINATION WITHOUT ABNORMAL FINDINGS: ICD-10-CM

## 2024-06-14 DIAGNOSIS — G40.219 LOCALIZATION-RELATED (FOCAL) (PARTIAL) SYMPTOMATIC EPILEPSY AND EPILEPTIC SYNDROMES WITH COMPLEX PARTIAL SEIZURES, INTRACTABLE, W/OUT STATUS EPILEPTICUS: ICD-10-CM

## 2024-06-14 DIAGNOSIS — Z13.31 ENCOUNTER FOR SCREENING FOR DEPRESSION: ICD-10-CM

## 2024-06-14 DIAGNOSIS — H91.90 UNSPECIFIED HEARING LOSS, UNSPECIFIED EAR: ICD-10-CM

## 2024-06-14 DIAGNOSIS — E87.6 HYPOKALEMIA: ICD-10-CM

## 2024-06-14 DIAGNOSIS — Z12.11 ENCOUNTER FOR SCREENING FOR MALIGNANT NEOPLASM OF COLON: ICD-10-CM

## 2024-06-14 PROCEDURE — G0463: CPT

## 2024-06-14 PROCEDURE — 99396 PREV VISIT EST AGE 40-64: CPT | Mod: 25

## 2024-06-14 PROCEDURE — G0442 ANNUAL ALCOHOL SCREEN 15 MIN: CPT | Mod: 59

## 2024-06-14 PROCEDURE — G0444 DEPRESSION SCREEN ANNUAL: CPT | Mod: 59

## 2024-06-15 ENCOUNTER — NON-APPOINTMENT (OUTPATIENT)
Age: 49
End: 2024-06-15

## 2024-06-15 LAB
25(OH)D3 SERPL-MCNC: 17.2 NG/ML
ALBUMIN SERPL ELPH-MCNC: 4.1 G/DL
ALP BLD-CCNC: 104 U/L
ALT SERPL-CCNC: 11 U/L
ANION GAP SERPL CALC-SCNC: 13 MMOL/L
AST SERPL-CCNC: 16 U/L
BASOPHILS # BLD AUTO: 0.02 K/UL
BASOPHILS NFR BLD AUTO: 0.5 %
BILIRUB SERPL-MCNC: <0.2 MG/DL
BUN SERPL-MCNC: 15 MG/DL
CALCIUM SERPL-MCNC: 10.1 MG/DL
CHLORIDE SERPL-SCNC: 105 MMOL/L
CHOLEST SERPL-MCNC: 186 MG/DL
CO2 SERPL-SCNC: 25 MMOL/L
CREAT SERPL-MCNC: 1.24 MG/DL
CREAT SPEC-SCNC: 204 MG/DL
EGFR: 54 ML/MIN/1.73M2
EOSINOPHIL # BLD AUTO: 0.04 K/UL
EOSINOPHIL NFR BLD AUTO: 1 %
ESTIMATED AVERAGE GLUCOSE: 123 MG/DL
FOLATE SERPL-MCNC: 10.1 NG/ML
GLUCOSE SERPL-MCNC: 84 MG/DL
HBA1C MFR BLD HPLC: 5.9 %
HCT VFR BLD CALC: 41.7 %
HDLC SERPL-MCNC: 69 MG/DL
HGB BLD-MCNC: 12.8 G/DL
IMM GRANULOCYTES NFR BLD AUTO: 0.2 %
LDLC SERPL CALC-MCNC: 102 MG/DL
LYMPHOCYTES # BLD AUTO: 1.46 K/UL
LYMPHOCYTES NFR BLD AUTO: 35.2 %
MAN DIFF?: NORMAL
MCHC RBC-ENTMCNC: 28.1 PG
MCHC RBC-ENTMCNC: 30.7 GM/DL
MCV RBC AUTO: 91.6 FL
MICROALBUMIN 24H UR DL<=1MG/L-MCNC: <1.2 MG/DL
MICROALBUMIN/CREAT 24H UR-RTO: NORMAL MG/G
MONOCYTES # BLD AUTO: 0.39 K/UL
MONOCYTES NFR BLD AUTO: 9.4 %
NEUTROPHILS # BLD AUTO: 2.23 K/UL
NEUTROPHILS NFR BLD AUTO: 53.7 %
NONHDLC SERPL-MCNC: 117 MG/DL
PLATELET # BLD AUTO: NORMAL K/UL
POTASSIUM SERPL-SCNC: 3.3 MMOL/L
PROT SERPL-MCNC: 8 G/DL
RBC # BLD: 4.55 M/UL
RBC # FLD: 13.6 %
SODIUM SERPL-SCNC: 143 MMOL/L
T4 FREE SERPL-MCNC: 1.1 NG/DL
TRIGL SERPL-MCNC: 87 MG/DL
TSH SERPL-ACNC: 1.35 UIU/ML
VIT B12 SERPL-MCNC: 626 PG/ML
WBC # FLD AUTO: 4.15 K/UL

## 2024-06-15 RX ORDER — POTASSIUM CHLORIDE 1500 MG/1
20 TABLET, FILM COATED, EXTENDED RELEASE ORAL
Qty: 14 | Refills: 0 | Status: ACTIVE | COMMUNITY
Start: 2024-06-15 | End: 1900-01-01

## 2024-06-18 LAB
LEVETIRACETAM SERPL-MCNC: 21.6 UG/ML
ZONISAMIDE SERPL-MCNC: 14.7 UG/ML

## 2024-06-23 PROBLEM — E87.6 HYPOKALEMIA: Status: ACTIVE | Noted: 2024-06-15

## 2024-06-23 PROBLEM — Z13.31 SCREENING FOR DEPRESSION: Status: ACTIVE | Noted: 2024-06-23

## 2024-06-23 PROBLEM — Z12.11 COLON CANCER SCREENING: Status: ACTIVE | Noted: 2023-04-11

## 2024-06-23 PROBLEM — Z13.39 SCREENING FOR ALCOHOLISM: Status: ACTIVE | Noted: 2024-06-23

## 2024-06-24 LAB
ANION GAP SERPL CALC-SCNC: 11 MMOL/L
BUN SERPL-MCNC: 10 MG/DL
CALCIUM SERPL-MCNC: 9.8 MG/DL
CHLORIDE SERPL-SCNC: 106 MMOL/L
CO2 SERPL-SCNC: 25 MMOL/L
CREAT SERPL-MCNC: 1.14 MG/DL
EGFR: 59 ML/MIN/1.73M2
GLUCOSE SERPL-MCNC: 91 MG/DL
POTASSIUM SERPL-SCNC: 3.3 MMOL/L
SODIUM SERPL-SCNC: 142 MMOL/L

## 2024-08-09 ENCOUNTER — RX RENEWAL (OUTPATIENT)
Age: 49
End: 2024-08-09

## 2024-09-06 ENCOUNTER — APPOINTMENT (OUTPATIENT)
Dept: OBGYN | Facility: CLINIC | Age: 49
End: 2024-09-06
Payer: COMMERCIAL

## 2024-09-06 VITALS
WEIGHT: 226 LBS | HEIGHT: 62 IN | DIASTOLIC BLOOD PRESSURE: 88 MMHG | SYSTOLIC BLOOD PRESSURE: 132 MMHG | BODY MASS INDEX: 41.59 KG/M2

## 2024-09-06 DIAGNOSIS — R92.30 DENSE BREASTS, UNSPECIFIED: ICD-10-CM

## 2024-09-06 DIAGNOSIS — Z01.419 ENCOUNTER FOR GYNECOLOGICAL EXAMINATION (GENERAL) (ROUTINE) W/OUT ABNORMAL FINDINGS: ICD-10-CM

## 2024-09-06 DIAGNOSIS — N76.2 ACUTE VULVITIS: ICD-10-CM

## 2024-09-06 DIAGNOSIS — Z20.2 CONTACT WITH AND (SUSPECTED) EXPOSURE TO INFECTIONS WITH A PREDOMINANTLY SEXUAL MODE OF TRANSMISSION: ICD-10-CM

## 2024-09-06 PROCEDURE — 99396 PREV VISIT EST AGE 40-64: CPT | Mod: 25

## 2024-09-06 RX ORDER — CLOTRIMAZOLE AND BETAMETHASONE DIPROPIONATE 10; .5 MG/G; MG/G
1-0.05 CREAM TOPICAL TWICE DAILY
Qty: 1 | Refills: 2 | Status: ACTIVE | COMMUNITY
Start: 2024-09-06 | End: 1900-01-01

## 2024-09-06 NOTE — PHYSICAL EXAM
[Examination Of The Breasts] : a normal appearance [No Masses] : no breast masses were palpable [Vulvitis] : vulvitis [Labia Majora] : normal [Labia Minora] : normal [Normal] : normal [Uterine Adnexae] : normal

## 2024-09-06 NOTE — DISCUSSION/SUMMARY
[FreeTextEntry1] : 49 y/o  LMP 2-3 years ago Patient here to establish care and annual exam Pap in 2023 negative HPV negative Breast imaging Patient declined HRT, not experiencing menopausal symptoms Patient complains of vulvar irritation, prescribed Lotrisone but would like STI screening, vaginal swab labs Patient has not had a colonoscopy, GI referral Follow-up 1 year as needed

## 2024-09-06 NOTE — HISTORY OF PRESENT ILLNESS
[FreeTextEntry1] : 49 y/o  LMP 2-3 years ago Patient here to establish care and annual exam Patient complains of vulvar irritation Patient not been relationship for a while  but would like STI screening Patient has not had a colonoscopy

## 2024-09-09 LAB
C TRACH RRNA SPEC QL NAA+PROBE: NOT DETECTED
N GONORRHOEA RRNA SPEC QL NAA+PROBE: NOT DETECTED
SOURCE AMPLIFICATION: NORMAL

## 2024-12-13 ENCOUNTER — APPOINTMENT (OUTPATIENT)
Dept: INTERNAL MEDICINE | Facility: CLINIC | Age: 49
End: 2024-12-13

## 2025-01-14 ENCOUNTER — APPOINTMENT (OUTPATIENT)
Dept: INTERNAL MEDICINE | Facility: CLINIC | Age: 50
End: 2025-01-14
Payer: COMMERCIAL

## 2025-01-14 ENCOUNTER — OUTPATIENT (OUTPATIENT)
Dept: OUTPATIENT SERVICES | Facility: HOSPITAL | Age: 50
LOS: 1 days | End: 2025-01-14
Payer: COMMERCIAL

## 2025-01-14 VITALS
SYSTOLIC BLOOD PRESSURE: 140 MMHG | WEIGHT: 235 LBS | OXYGEN SATURATION: 99 % | DIASTOLIC BLOOD PRESSURE: 100 MMHG | BODY MASS INDEX: 42.98 KG/M2 | HEART RATE: 79 BPM

## 2025-01-14 DIAGNOSIS — Z86.39 PERSONAL HISTORY OF OTHER ENDOCRINE, NUTRITIONAL AND METABOLIC DISEASE: ICD-10-CM

## 2025-01-14 DIAGNOSIS — I10 ESSENTIAL (PRIMARY) HYPERTENSION: ICD-10-CM

## 2025-01-14 DIAGNOSIS — E66.9 OBESITY, UNSPECIFIED: ICD-10-CM

## 2025-01-14 PROCEDURE — G0463: CPT

## 2025-01-14 PROCEDURE — 99214 OFFICE O/P EST MOD 30 MIN: CPT | Mod: GC

## 2025-01-14 PROCEDURE — G2211 COMPLEX E/M VISIT ADD ON: CPT

## 2025-01-16 RX ORDER — SEMAGLUTIDE 0.25 MG/.5ML
0.25 INJECTION, SOLUTION SUBCUTANEOUS
Qty: 1 | Refills: 0 | Status: ACTIVE | COMMUNITY
Start: 2025-01-14

## 2025-01-22 DIAGNOSIS — E66.9 OBESITY, UNSPECIFIED: ICD-10-CM

## 2025-05-03 ENCOUNTER — OUTPATIENT (OUTPATIENT)
Dept: OUTPATIENT SERVICES | Facility: HOSPITAL | Age: 50
LOS: 1 days | End: 2025-05-03
Payer: COMMERCIAL

## 2025-05-03 ENCOUNTER — RESULT REVIEW (OUTPATIENT)
Age: 50
End: 2025-05-03

## 2025-05-03 ENCOUNTER — APPOINTMENT (OUTPATIENT)
Dept: MAMMOGRAPHY | Facility: IMAGING CENTER | Age: 50
End: 2025-05-03
Payer: COMMERCIAL

## 2025-05-03 ENCOUNTER — APPOINTMENT (OUTPATIENT)
Dept: ULTRASOUND IMAGING | Facility: IMAGING CENTER | Age: 50
End: 2025-05-03
Payer: COMMERCIAL

## 2025-05-03 DIAGNOSIS — Z00.8 ENCOUNTER FOR OTHER GENERAL EXAMINATION: ICD-10-CM

## 2025-05-03 PROCEDURE — 77063 BREAST TOMOSYNTHESIS BI: CPT | Mod: 26

## 2025-05-03 PROCEDURE — 77067 SCR MAMMO BI INCL CAD: CPT

## 2025-05-03 PROCEDURE — 76641 ULTRASOUND BREAST COMPLETE: CPT | Mod: 26,50

## 2025-05-03 PROCEDURE — 76641 ULTRASOUND BREAST COMPLETE: CPT

## 2025-05-03 PROCEDURE — 77067 SCR MAMMO BI INCL CAD: CPT | Mod: 26

## 2025-05-03 PROCEDURE — 77063 BREAST TOMOSYNTHESIS BI: CPT

## 2025-06-16 ENCOUNTER — NON-APPOINTMENT (OUTPATIENT)
Age: 50
End: 2025-06-16

## 2025-07-29 ENCOUNTER — LABORATORY RESULT (OUTPATIENT)
Age: 50
End: 2025-07-29

## 2025-08-06 ENCOUNTER — RX RENEWAL (OUTPATIENT)
Age: 50
End: 2025-08-06